# Patient Record
Sex: MALE | Race: BLACK OR AFRICAN AMERICAN | NOT HISPANIC OR LATINO | Employment: OTHER | URBAN - METROPOLITAN AREA
[De-identification: names, ages, dates, MRNs, and addresses within clinical notes are randomized per-mention and may not be internally consistent; named-entity substitution may affect disease eponyms.]

---

## 2019-03-27 ENCOUNTER — OFFICE VISIT (OUTPATIENT)
Dept: FAMILY MEDICINE | Facility: HOSPITAL | Age: 59
End: 2019-03-27
Payer: MEDICAID

## 2019-03-27 VITALS
SYSTOLIC BLOOD PRESSURE: 116 MMHG | HEIGHT: 68 IN | WEIGHT: 188.06 LBS | DIASTOLIC BLOOD PRESSURE: 68 MMHG | HEART RATE: 59 BPM | BODY MASS INDEX: 28.5 KG/M2

## 2019-03-27 DIAGNOSIS — E78.5 HYPERLIPIDEMIA, UNSPECIFIED HYPERLIPIDEMIA TYPE: ICD-10-CM

## 2019-03-27 DIAGNOSIS — M17.12 PRIMARY OSTEOARTHRITIS OF LEFT KNEE: Primary | ICD-10-CM

## 2019-03-27 DIAGNOSIS — I10 ESSENTIAL HYPERTENSION: ICD-10-CM

## 2019-03-27 PROCEDURE — 99203 OFFICE O/P NEW LOW 30 MIN: CPT | Performed by: STUDENT IN AN ORGANIZED HEALTH CARE EDUCATION/TRAINING PROGRAM

## 2019-03-27 RX ORDER — AZITHROMYCIN 250 MG/1
TABLET, FILM COATED ORAL
Refills: 0 | COMMUNITY
Start: 2018-12-31 | End: 2019-05-13

## 2019-03-27 RX ORDER — HYDROCODONE BITARTRATE AND ACETAMINOPHEN 7.5; 325 MG/1; MG/1
TABLET ORAL
Refills: 0 | COMMUNITY
Start: 2019-02-28 | End: 2019-05-13

## 2019-03-27 RX ORDER — TIZANIDINE 4 MG/1
TABLET ORAL
Refills: 3 | COMMUNITY
Start: 2019-02-28 | End: 2019-05-13

## 2019-03-27 RX ORDER — MELOXICAM 7.5 MG/1
7.5 TABLET ORAL
COMMUNITY
Start: 2019-03-26 | End: 2019-03-27 | Stop reason: ALTCHOICE

## 2019-03-27 RX ORDER — MELOXICAM 7.5 MG/1
TABLET ORAL
COMMUNITY
Start: 2019-03-26 | End: 2019-03-27 | Stop reason: ALTCHOICE

## 2019-03-27 RX ORDER — HYDROCODONE BITARTRATE AND ACETAMINOPHEN 10; 325 MG/1; MG/1
1 TABLET ORAL 2 TIMES DAILY PRN
Refills: 0 | COMMUNITY
Start: 2019-01-29 | End: 2020-10-06

## 2019-03-27 RX ORDER — ATORVASTATIN CALCIUM 40 MG/1
TABLET, FILM COATED ORAL
Refills: 3 | COMMUNITY
Start: 2019-02-28 | End: 2020-10-22

## 2019-03-27 RX ORDER — GABAPENTIN 100 MG/1
CAPSULE ORAL
Refills: 3 | COMMUNITY
Start: 2019-02-28 | End: 2019-05-13

## 2019-03-27 RX ORDER — DICLOFENAC SODIUM 75 MG/1
75 TABLET, DELAYED RELEASE ORAL
COMMUNITY
Start: 2018-12-08 | End: 2019-05-13

## 2019-03-27 RX ORDER — FAMOTIDINE 20 MG/1
20 TABLET, FILM COATED ORAL 2 TIMES DAILY
Qty: 60 TABLET | Refills: 11 | Status: SHIPPED | OUTPATIENT
Start: 2019-03-27 | End: 2019-05-13

## 2019-03-27 RX ORDER — SULINDAC 150 MG/1
150 TABLET ORAL 2 TIMES DAILY
Qty: 28 TABLET | Refills: 0 | Status: SHIPPED | OUTPATIENT
Start: 2019-03-27 | End: 2019-04-10

## 2019-03-27 RX ORDER — AMLODIPINE BESYLATE 5 MG/1
TABLET ORAL
Refills: 3 | COMMUNITY
Start: 2019-02-28 | End: 2020-10-22

## 2019-03-27 NOTE — PROGRESS NOTES
Subjective:       Patient ID: Robert Lake is a 58 y.o. male.    Chief Complaint: Establish Care    HPI     HTN norvasc 5mg atovastin 40    OA on left knee started 2 weeks, no hx of MVA and fall. He went to ED     He said nothing helps beside Narinder, he said Dr. Gallo is no longer writing him any prescrption. He said he tried he tried PT a few month ago, tried for 2~3 month.     He had inguinal hernia repair in 2008, got a referral to , but he couldn't go because he said he's taking care of his diabetic mother.        FINDINGS:  No acute fracture or malalignment identified. Joint spaces are maintained without significant arthrosis. Patellar enthesophyte formation. Moderate knee joint effusion. No localized soft tissue swelling.  No unintended radiopaque foreign body identified.       Review of Systems   Constitutional: Positive for chills. Negative for activity change, fatigue and fever.   HENT: Negative for hearing loss.    Eyes: Negative for visual disturbance.   Respiratory: Negative for cough, chest tightness and shortness of breath.    Cardiovascular: Negative for chest pain, palpitations and leg swelling.   Gastrointestinal: Negative for abdominal distention, abdominal pain, blood in stool, constipation, diarrhea, nausea and vomiting.   Genitourinary: Negative for dysuria.   Musculoskeletal: Positive for arthralgias. Negative for back pain.   Skin: Negative for color change and wound.   Neurological: Negative for headaches.   Psychiatric/Behavioral: Negative for agitation. The patient is not nervous/anxious.          Objective:      Vitals:    03/27/19 1338   BP: 116/68   Pulse: (!) 59     Physical Exam   Constitutional: He is oriented to person, place, and time. He appears well-developed and well-nourished. No distress.   HENT:   Head: Normocephalic and atraumatic.   Nose: Nose normal.   Eyes: Conjunctivae and EOM are normal. No scleral icterus.   Neck: Normal range of motion. Neck supple.    Cardiovascular: Normal rate, regular rhythm and normal heart sounds. Exam reveals no gallop and no friction rub.   No murmur heard.  Pulmonary/Chest: Effort normal and breath sounds normal. No respiratory distress. He has no wheezes. He has no rales. He exhibits no tenderness.   Abdominal: Soft. Bowel sounds are normal. He exhibits no distension and no mass. There is no tenderness. There is no rebound and no guarding.   Musculoskeletal: Normal range of motion. He exhibits tenderness (left knee with movement). He exhibits no edema or deformity.   Neurological: He is alert and oriented to person, place, and time.   Skin: Skin is warm. Capillary refill takes less than 2 seconds. He is not diaphoretic.   Psychiatric: He has a normal mood and affect.       Assessment:       1. Primary osteoarthritis of left knee    2. Essential hypertension    3. Hyperlipidemia, unspecified hyperlipidemia type        Plan:       Primary osteoarthritis of left knee  -     sulindac (CLINORIL) 150 MG tablet; Take 1 tablet (150 mg total) by mouth 2 (two) times daily. for 14 days  Dispense: 28 tablet; Refill: 0  -     famotidine (PEPCID) 20 MG tablet; Take 1 tablet (20 mg total) by mouth 2 (two) times daily.  Dispense: 60 tablet; Refill: 11    Essential hypertension  bp stable  cnt current tx    Hyperlipidemia, unspecified hyperlipidemia type  Diet and exercise  cnt current statin.    Follow up in about 2 weeks (around 4/10/2019) for knee injection.

## 2019-04-15 DIAGNOSIS — M25.562 LEFT KNEE PAIN, UNSPECIFIED CHRONICITY: Primary | ICD-10-CM

## 2019-04-16 ENCOUNTER — OFFICE VISIT (OUTPATIENT)
Dept: ORTHOPEDICS | Facility: CLINIC | Age: 59
End: 2019-04-16
Payer: MEDICAID

## 2019-04-16 VITALS
BODY MASS INDEX: 27.58 KG/M2 | SYSTOLIC BLOOD PRESSURE: 102 MMHG | WEIGHT: 182 LBS | HEIGHT: 68 IN | DIASTOLIC BLOOD PRESSURE: 64 MMHG

## 2019-04-16 DIAGNOSIS — M25.562 LEFT KNEE PAIN, UNSPECIFIED CHRONICITY: Primary | ICD-10-CM

## 2019-04-16 DIAGNOSIS — M25.462 EFFUSION, LEFT KNEE: ICD-10-CM

## 2019-04-16 PROCEDURE — 87102 FUNGUS ISOLATION CULTURE: CPT

## 2019-04-16 PROCEDURE — 87205 SMEAR GRAM STAIN: CPT

## 2019-04-16 PROCEDURE — 87075 CULTR BACTERIA EXCEPT BLOOD: CPT

## 2019-04-16 PROCEDURE — 99999 PR PBB SHADOW E&M-EST. PATIENT-LVL II: ICD-10-PCS | Mod: PBBFAC,,, | Performed by: ORTHOPAEDIC SURGERY

## 2019-04-16 PROCEDURE — 89060 EXAM SYNOVIAL FLUID CRYSTALS: CPT

## 2019-04-16 PROCEDURE — 89051 BODY FLUID CELL COUNT: CPT

## 2019-04-16 PROCEDURE — 87070 CULTURE OTHR SPECIMN AEROBIC: CPT

## 2019-04-16 PROCEDURE — 20611 DRAIN/INJ JOINT/BURSA W/US: CPT | Mod: PBBFAC,PN | Performed by: ORTHOPAEDIC SURGERY

## 2019-04-16 PROCEDURE — 99999 PR PBB SHADOW E&M-EST. PATIENT-LVL II: CPT | Mod: PBBFAC,,, | Performed by: ORTHOPAEDIC SURGERY

## 2019-04-16 PROCEDURE — 20611 DRAIN/INJ JOINT/BURSA W/US: CPT | Mod: S$PBB,LT,, | Performed by: ORTHOPAEDIC SURGERY

## 2019-04-16 PROCEDURE — 99204 PR OFFICE/OUTPT VISIT, NEW, LEVL IV, 45-59 MIN: ICD-10-PCS | Mod: 25,S$PBB,, | Performed by: ORTHOPAEDIC SURGERY

## 2019-04-16 PROCEDURE — 20611 PR DRAIN/ASP/INJECT MAJOR JOINT/BURSA W/US GUIDANCE: ICD-10-PCS | Mod: S$PBB,LT,, | Performed by: ORTHOPAEDIC SURGERY

## 2019-04-16 PROCEDURE — 99204 OFFICE O/P NEW MOD 45 MIN: CPT | Mod: 25,S$PBB,, | Performed by: ORTHOPAEDIC SURGERY

## 2019-04-16 PROCEDURE — 99212 OFFICE O/P EST SF 10 MIN: CPT | Mod: PBBFAC,PN,25 | Performed by: ORTHOPAEDIC SURGERY

## 2019-04-16 RX ORDER — TRIAMCINOLONE ACETONIDE 40 MG/ML
40 INJECTION, SUSPENSION INTRA-ARTICULAR; INTRAMUSCULAR
Status: DISCONTINUED | OUTPATIENT
Start: 2019-04-16 | End: 2020-10-06

## 2019-04-16 NOTE — PROGRESS NOTES
CC: left knee pain    58 y.o. Male presents today for evaluation of his left knee pain.   How long: Patient reports he has had left knee pain for over two weeks that has been getting worse. He reports he was cutting grass and the next day his left knee started swelling. Patient denies any injury or trauma. Patient reports his pain is excruciating and radiates from his knee to his hip and down to his foot. Patient reports his pain is constant.   What makes it better: Patient reports nothing is making him feel better.   What makes it worse: He feels the worst when walking or standing.   Does it radiate: Patient reports radiating pain from hip to foot.   Attempted treatments: Patient reports he has tried ice and heat and these did not help. He states he had an injection years ago into his right knee which helped a lot. Patient was given Tramadol for his knee pain but states this does not help his pain.   Pain score: Patient reports his pain is 10/10  Any mechanical symptoms: Patient reports his knee pops and clicks.   Feelings of instability: Patient reports his knee gives out on him daily.   Affect on ADLs: Patient reports he is unable to sleep.     REVIEW OF SYSTEMS:   Constitution: Patient denies fever, chills, night sweats, and weight changes.  Eyes: Patient denies eye pain or vision changes.  HENT: Patient denies headache, ear pain, sore throat, or nasal discharge.  CVS: Patient denies chest pain.  Lungs: Patient denies or cough or SOB at this time.   Abd: Patient denies stomach pain, nausea, or vomiting.  Skin: Patient denies skin rash or itching.    Hematologic/Lymphatic: Patient reports he bruises easily.   Musculoskeletal: Patient denies recent falls. See HPI.  Psych: Patient denies any current anxiety or nervousness.    PAST MEDICAL HISTORY:   History reviewed. No pertinent past medical history.    PAST SURGICAL HISTORY:   History reviewed. No pertinent surgical history.    FAMILY HISTORY:   History reviewed.  No pertinent family history.    SOCIAL HISTORY:   Social History     Socioeconomic History    Marital status: Single     Spouse name: Not on file    Number of children: Not on file    Years of education: Not on file    Highest education level: Not on file   Occupational History    Not on file   Social Needs    Financial resource strain: Not on file    Food insecurity:     Worry: Not on file     Inability: Not on file    Transportation needs:     Medical: Not on file     Non-medical: Not on file   Tobacco Use    Smoking status: Never Smoker    Smokeless tobacco: Never Used   Substance and Sexual Activity    Alcohol use: Never     Frequency: Never    Drug use: Never    Sexual activity: Yes     Partners: Female   Lifestyle    Physical activity:     Days per week: Not on file     Minutes per session: Not on file    Stress: Not on file   Relationships    Social connections:     Talks on phone: Not on file     Gets together: Not on file     Attends [employer] service: Not on file     Active member of club or organization: Not on file     Attends meetings of clubs or organizations: Not on file     Relationship status: Not on file   Other Topics Concern    Not on file   Social History Narrative    Not on file       MEDICATIONS:     Current Outpatient Medications:     amLODIPine (NORVASC) 5 MG tablet, TK 1 T PO QD, Disp: , Rfl: 3    atorvastatin (LIPITOR) 40 MG tablet, TK 1 T PO  QHS, Disp: , Rfl: 3    azithromycin (Z-LYDIA) 250 MG tablet, TAKE 2 TABLETS BY MOUTH TODAY THEN STARTING TOMORROW ONE DAILY FOR 4 DAYS, Disp: , Rfl: 0    diclofenac (VOLTAREN) 75 MG EC tablet, Take 75 mg by mouth., Disp: , Rfl:     famotidine (PEPCID) 20 MG tablet, Take 1 tablet (20 mg total) by mouth 2 (two) times daily., Disp: 60 tablet, Rfl: 11    gabapentin (NEURONTIN) 100 MG capsule, TK 1 C PO QD ATN, Disp: , Rfl: 3    HYDROcodone-acetaminophen (NORCO)  mg per tablet, Take 1 tablet by mouth 2 (two) times daily as  "needed., Disp: , Rfl: 0    HYDROcodone-acetaminophen (NORCO) 7.5-325 mg per tablet, TK SS TO ONE T PO BID PRN 30 DAY SUPPLY, Disp: , Rfl: 0    tiZANidine (ZANAFLEX) 4 MG tablet, TK 1/2 TO 1 T PO BID PRF SPASMS, Disp: , Rfl: 3    Current Facility-Administered Medications:     triamcinolone acetonide injection 40 mg, 40 mg, Intra-articular, 1 time in Clinic/HOD, Pineda New DO    ALLERGIES:   Review of patient's allergies indicates:  No Known Allergies     PHYSICAL EXAMINATION:  /64   Ht 5' 8" (1.727 m)   Wt 82.6 kg (182 lb)   BMI 27.67 kg/m²   Vitals signs and nursing note have been reviewed.  General: In no acute distress, well developed, well nourished, no diaphoresis  Eyes: EOM full and smooth, no eye redness or discharge  HENT: normocephalic and atraumatic, neck supple, trachea midline, no nasal discharge, no external ear redness or discharge  Cardiovascular: 2+ and symmetric DP pulses bilaterally, no LE edema  Lungs: respirations non-labored, no conversational dyspnea   Abd: non-distended, no rigidity  MSK: no amputation or deformity, no swelling of extremities  Neuro: AAOx3, CN2-12 grossly intact  Skin: No rashes, warm and dry  Psychiatric: cooperative, pleasant, mood and affect appropriate for age    MUSCULOSKELETAL EXAM:    LEFT KNEE EXAMINATION   Affected side is compared to contralateral knee     Observation:  No edema, erythema, ecchymosis noted. Large palpable suprapatellar effusion on the left.  No muscle atrophy of the thighs and calves noted.  No obvious bony deformities noted.   No genu valgus/varum noted. No recurvatum noted.    No tibial internal/external torsion.      Tenderness:  Patella - present   Lateral joint line - present  Quad tendon - none   Medial joint line - present  Patellar tendon - none   Medial plica - none  Tibial tubercle - none   Lateral plica - none  Pes anserine - none   MCL prox - none  Distal ITB - none   MCL distal - none  MFC - none    LCL prox - none  LFC " - none    LCL distal - none  Tibia - none    Fibula - none    No obvious bursae, plicae, popliteal cysts, or tendon derangement palpated.          ROM:   Active extension to 0° on left without hyperextension, lag, crepitus, or patellar J sign.   Active extension to 0° on right without hyperextension, lag, crepitus, or patellar J sign.   Active flexion to 80° on left and 135° on right.  Decreased motion on left due to pain.    Strength: (bilaterally)  Knee Flexion - 5/5 on left and 5/5 on right  Knee Extension - 5/5 on left and 5/5 on right  Hip Flexion - 5/5 on left and 5/5 on right  Hip Extension - 5/5 on left and 5/5 on right  Ankle dorsiflexion - 5/5 on left and 5/5 on right  Ankle Plantarflexion - 5/5 on left and 5/5 on right    Patellofemoral Exam:  Patellar ballottement - positive  Bulge sign - positive  Patellar grind - negative  No patellar laxity with medial and lateral translation   No apprehension with medial and lateral patellar translation.     Meniscus Testing:     Unable to accurately assess due to pain with motion from swelling    Ligament Testing:  Unable to accurately assess due to pain with motion from swelling    Neurovascular Examination:   Antalgic gait favoring left knee, improved post aspiration  Sensation intact to light touch in the obturator, lateral/intermediate/medial/posterior femoral cutaneous, saphenous, and common peroneal nerves bilaterally.  Pulses intact at the DP and PT arteries bilaterally.    Capillary refill intact <2 seconds in all toes bilaterally.      IMAGIN. X-ray ordered due to left knee pain.   2. X-ray images were reviewed personally by me and then directly with patient.  3. FINDINGS: X-ray images obtained demonstrate no cortical irregularities, sclerosis, osteophyte formation, or subchonral cysts. There is bilateral medial joint space narrowing.  4. IMPRESSION: No pathology or irregularities appreciated.       PROCEDURE:  Large Joint Aspiration/Injection  Knee  "joint, left    Performed by: YASMANI BEVERLY.  Authorized by: YASMANI BEVERLY  Consent Done?: Yes (Verbal and written)  Indications: Pain and joint effusion  Site marked: The procedure site was marked   Timeout: Prior to procedure the correct patient, procedure, and site was verified     Location: Knee joint, left  Prep: Patient was prepped with Chlorhexidine and alcohol.  Skin anesthetic: Ethyl Chloride spray was used prior to skin puncture. After cold spray was applied, 2-4 cc's of 0.5% bupivacaine was injected into the skin and superficial tissue at the injection site using a 25 G, 1.5" needle to form an anesthetic tunnel and ensure proper placement of the needle into the joint space.  Ultrasound Guidance for needle placement: yes  Procedure: After local anesthetic was applied a 18 G, 1.5 needle was used to enter the knee joint capsule under US guidance. 18cc of clear synovial fluid was aspirated. Following aspiration, 1 cc of 40 mg/ml triamcinolone acetonide was injected into the knee joint.   Approach: superiorlateral  Medications: 40 mg triamcinolone acetonide 40 mg/mL  Patient tolerance: Patient tolerated the procedure well with no immediate complications. Improvement noted after aspiration. Patient was wrapped in ACE wrap following bandaging.    Ultrasound guidance was used for needle localization. Images were saved and stored for documentation. Short and long axis images of the anterior knee were taken prior to injection confirming presence of joint effusion. Dynamic visualization of the needle was continuous throughout the procedures.    Triamcinolone:  NDC: [address]-4  LOT: [id number]  EXP: 09/2020    ASSESSMENT:      ICD-10-CM ICD-9-CM   1. Left knee pain, unspecified chronicity M25.562 719.46   2. Effusion, left knee M25.462 719.06       PLAN:  1-2.  Left knee pain/effusion -     - Robert has been suffering from left knee pain and swelling for the past 2 weeks.  He denies any specific injury or " incident that started the swelling, but states that he was mowing the lawn the day before it started. He denies any twisting injuries with mowing the lawn.  He has not found any benefit from icing or heat.  He was given Mobic from his primary care but stopped taking it due to it being ineffective.  He also states that he had tramadol which he took but this did not help him either.    - After evaluating and noting the large suprapatellar effusion, I discussed the pros and cons of an attempted joint aspiration today. I also stated that depending on what the fluid looked like we could inject cortisone into the knee to help decrease the intra-articular cortisone injection. He consented to this procedure. See above for procedure detail.    - Upon evaluation of the knee effusion under ultrasound, there was multiple pockets of fluid with multiple areas of thickened synovium.  I was able to navigate the needle around the thickened synovium to aspirate some fluid, after which she felt immediately better.  There is an additional pocket of fluid excess full from the medial aspect of the knee. An attempt was made aspirate this medial area, but I was unable to successfully aspirate any fluid. After the procedure was completed, he was able to stand up and walk and immediately was pleased with his pain reduction.    - Knee joint fluid was sent to the lab for further evaluation.    - I advised him to restart the Mobic 15 mg tablets daily for 2 weeks to help prevent swelling from returning.  I also recommended him avoid heat as this will make swelling worse.  I recommend icing and wrapping consistently, especially in the first few days post aspiration.      Future planning includes - reassess in 2-3 weeks if swelling returns.  MRI would be indicated if swelling continues.    All questions were answered to the best of my ability and all concerns were addressed at this time.    Follow up in 2-3 weeks for above if needed.

## 2019-04-17 LAB
APPEARANCE FLD: NORMAL
BODY FLD TYPE: NORMAL
BODY FLD TYPE: NORMAL
COLOR FLD: YELLOW
CRYSTALS FLD MICRO: NEGATIVE
EOSINOPHIL NFR FLD MANUAL: 1 %
GRAM STN SPEC: NORMAL
GRAM STN SPEC: NORMAL
LYMPHOCYTES NFR FLD MANUAL: 7 %
MESOTHL CELL NFR FLD MANUAL: 1 %
MONOS+MACROS NFR FLD MANUAL: 86 %
NEUTROPHILS NFR FLD MANUAL: 5 %
PATH INTERP FLD-IMP: NORMAL
WBC # FLD: 145 /CU MM

## 2019-04-20 LAB
BACTERIA SPEC AEROBE CULT: NO GROWTH
BACTERIA SPEC AEROBE CULT: NO GROWTH

## 2019-04-23 ENCOUNTER — TELEPHONE (OUTPATIENT)
Dept: SPORTS MEDICINE | Facility: CLINIC | Age: 59
End: 2019-04-23

## 2019-04-23 NOTE — TELEPHONE ENCOUNTER
----- Message from Kendy Fonseca sent at 4/23/2019  2:13 PM CDT -----  Contact: self/[phone]  Type:  Sooner Apoointment Request    Caller is requesting a sooner appointment.  Caller declined first available appointment listed below.  Caller will not accept being placed on the waitlist and is requesting a message be sent to doctor.  Name of Caller: Jones  When is the first available appointment? 05/14/19  Symptoms: Fluid on knee/ankle  Would the patient rather a call back or a response via [facility]?  Call back  Best Call Back Number: [phone]  Additional Information: cannot put his sock on

## 2019-04-23 NOTE — TELEPHONE ENCOUNTER
Left VM letting patient know that Dr. New does not have any availability in [place] until the second week in May. I instructed him to go to an Urgent Care or ED if he is in that much pain. I also let him know that the phone staff sent his information to Dr. Cadet who may be able to see him sooner but that again if he was in a lot of pain and uncomfortable that he should go to the ED.

## 2019-04-24 LAB — BACTERIA SPEC ANAEROBE CULT: NORMAL

## 2019-05-13 ENCOUNTER — OFFICE VISIT (OUTPATIENT)
Dept: FAMILY MEDICINE | Facility: HOSPITAL | Age: 59
End: 2019-05-13
Attending: FAMILY MEDICINE
Payer: MEDICAID

## 2019-05-13 VITALS
SYSTOLIC BLOOD PRESSURE: 114 MMHG | HEART RATE: 61 BPM | HEIGHT: 69 IN | BODY MASS INDEX: 26.12 KG/M2 | DIASTOLIC BLOOD PRESSURE: 72 MMHG | WEIGHT: 176.38 LBS

## 2019-05-13 DIAGNOSIS — E78.2 MIXED HYPERLIPIDEMIA: ICD-10-CM

## 2019-05-13 DIAGNOSIS — F41.1 GAD (GENERALIZED ANXIETY DISORDER): ICD-10-CM

## 2019-05-13 DIAGNOSIS — M17.12 PRIMARY OSTEOARTHRITIS OF LEFT KNEE: Primary | ICD-10-CM

## 2019-05-13 DIAGNOSIS — I10 ESSENTIAL HYPERTENSION: ICD-10-CM

## 2019-05-13 PROCEDURE — 99214 OFFICE O/P EST MOD 30 MIN: CPT | Performed by: STUDENT IN AN ORGANIZED HEALTH CARE EDUCATION/TRAINING PROGRAM

## 2019-05-13 RX ORDER — NAPROXEN 500 MG/1
500 TABLET ORAL 2 TIMES DAILY
Refills: 0 | COMMUNITY
Start: 2019-04-07 | End: 2019-05-13

## 2019-05-13 RX ORDER — AMITRIPTYLINE HYDROCHLORIDE 10 MG/1
10 TABLET, FILM COATED ORAL NIGHTLY PRN
Qty: 30 TABLET | Refills: 2 | Status: SHIPPED | OUTPATIENT
Start: 2019-05-13 | End: 2019-08-15 | Stop reason: SDUPTHER

## 2019-05-13 RX ORDER — MELOXICAM 7.5 MG/1
15 TABLET ORAL DAILY
Qty: 34 TABLET | Refills: 0 | Status: SHIPPED | OUTPATIENT
Start: 2019-05-13 | End: 2019-05-30

## 2019-05-13 NOTE — PROGRESS NOTES
Subjective:       Patient ID: Robert Lake is a 58 y.o. male.    Chief Complaint: Osteoarthritis    HPI   Feel depressed about knee pain for a while. He said he feels sad and would like something to help with his anxiety.ANA and PHQ high. Advise to take elavil at night only. Don't take it before drive. He said his mother and him have taking care of a 15mo boy. His left knee has been tapped and was given a steroid injection 3 wks ago w/o any help.     Colonoscopy 2017 was told normal.    Review of Systems   Constitutional: Negative for activity change, chills, fatigue and fever.   HENT: Negative for hearing loss.    Eyes: Negative for visual disturbance.   Respiratory: Negative for cough, chest tightness and shortness of breath.    Cardiovascular: Negative for chest pain, palpitations and leg swelling.   Gastrointestinal: Negative for abdominal distention, abdominal pain, blood in stool, constipation, diarrhea, nausea and vomiting.   Genitourinary: Negative for dysuria.   Musculoskeletal: Positive for arthralgias and joint swelling. Negative for back pain.   Skin: Negative for color change and wound.   Neurological: Negative for headaches.   Psychiatric/Behavioral: Negative for agitation. The patient is not nervous/anxious.            Objective:      Vitals:    05/13/19 1411   BP: 114/72   Pulse: 61     Physical Exam   Constitutional: He is oriented to person, place, and time. He appears well-developed and well-nourished. No distress.   HENT:   Head: Normocephalic and atraumatic.   Nose: Nose normal.   Eyes: Conjunctivae and EOM are normal. No scleral icterus.   Neck: Normal range of motion. Neck supple.   Cardiovascular: Normal rate, regular rhythm and normal heart sounds. Exam reveals no gallop and no friction rub.   No murmur heard.  Pulmonary/Chest: Effort normal and breath sounds normal. No respiratory distress. He has no wheezes. He has no rales. He exhibits no tenderness.   Abdominal: Soft. Bowel sounds are  normal. He exhibits no distension and no mass. There is no tenderness. There is no rebound and no guarding.   Musculoskeletal: Normal range of motion. He exhibits tenderness (left knee with movement). He exhibits no edema or deformity.   Neurological: He is alert and oriented to person, place, and time.   Skin: Skin is warm. Capillary refill takes less than 2 seconds. He is not diaphoretic.   Psychiatric: He has a normal mood and affect.       Assessment:       1. Primary osteoarthritis of left knee    2. Essential hypertension    3. Mixed hyperlipidemia    4. ANA (generalized anxiety disorder)        Plan:       Primary osteoarthritis of left knee  -     Cancel: Ambulatory Referral to Physical/Occupational Therapy  -     Ambulatory Referral to Physical/Occupational Therapy  -     amitriptyline (ELAVIL) 10 MG tablet; Take 1 tablet (10 mg total) by mouth nightly as needed for Insomnia or Pain.  Dispense: 30 tablet; Refill: 2  -     meloxicam (MOBIC) 7.5 MG tablet; Take 2 tablets (15 mg total) by mouth once daily. for 17 days  Dispense: 34 tablet; Refill: 0  Essential hypertension  bp at goal, cnt curent meds      Mixed hyperlipidemia  cnt statin    ANA (generalized anxiety disorder)  Elavil added  Will reassesses.       Follow up in about 1 month (around 6/10/2019) for OA and pain and anxiety. Will consider lyrica, SSRI for chronic pain sx 2/2 knee pain.

## 2019-05-13 NOTE — PROGRESS NOTES
I have reviewed the notes, assessments, and/or procedures performed, I concur with her/his documentation of Robert Lake.

## 2019-05-20 LAB — FUNGUS SPEC CULT: NORMAL

## 2019-08-15 RX ORDER — AMITRIPTYLINE HYDROCHLORIDE 10 MG/1
10 TABLET, FILM COATED ORAL NIGHTLY PRN
Qty: 30 TABLET | Refills: 2 | Status: SHIPPED | OUTPATIENT
Start: 2019-08-15 | End: 2020-10-06

## 2019-10-07 ENCOUNTER — TELEPHONE (OUTPATIENT)
Dept: SURGERY | Facility: CLINIC | Age: 59
End: 2019-10-07

## 2019-11-25 ENCOUNTER — OFFICE VISIT (OUTPATIENT)
Dept: ORTHOPEDICS | Facility: CLINIC | Age: 59
End: 2019-11-25
Payer: MEDICAID

## 2019-11-25 VITALS — HEIGHT: 69 IN | BODY MASS INDEX: 26.43 KG/M2

## 2019-11-25 DIAGNOSIS — M25.561 CHRONIC PAIN OF BOTH KNEES: Primary | ICD-10-CM

## 2019-11-25 DIAGNOSIS — M17.0 PRIMARY OSTEOARTHRITIS OF BOTH KNEES: ICD-10-CM

## 2019-11-25 DIAGNOSIS — G89.29 CHRONIC PAIN OF BOTH KNEES: Primary | ICD-10-CM

## 2019-11-25 DIAGNOSIS — M25.562 CHRONIC PAIN OF BOTH KNEES: Primary | ICD-10-CM

## 2019-11-25 PROCEDURE — 20611 PR DRAIN/ASP/INJECT MAJOR JOINT/BURSA W/US GUIDANCE: ICD-10-PCS | Mod: 50,S$PBB,, | Performed by: ORTHOPAEDIC SURGERY

## 2019-11-25 PROCEDURE — 99212 OFFICE O/P EST SF 10 MIN: CPT | Mod: PBBFAC,PN | Performed by: ORTHOPAEDIC SURGERY

## 2019-11-25 PROCEDURE — 20611 DRAIN/INJ JOINT/BURSA W/US: CPT | Mod: 50,S$PBB,, | Performed by: ORTHOPAEDIC SURGERY

## 2019-11-25 PROCEDURE — 99999 PR PBB SHADOW E&M-EST. PATIENT-LVL II: ICD-10-PCS | Mod: PBBFAC,,, | Performed by: ORTHOPAEDIC SURGERY

## 2019-11-25 PROCEDURE — 99214 OFFICE O/P EST MOD 30 MIN: CPT | Mod: 25,S$PBB,, | Performed by: ORTHOPAEDIC SURGERY

## 2019-11-25 PROCEDURE — 99999 PR PBB SHADOW E&M-EST. PATIENT-LVL II: CPT | Mod: PBBFAC,,, | Performed by: ORTHOPAEDIC SURGERY

## 2019-11-25 PROCEDURE — 99214 PR OFFICE/OUTPT VISIT, EST, LEVL IV, 30-39 MIN: ICD-10-PCS | Mod: 25,S$PBB,, | Performed by: ORTHOPAEDIC SURGERY

## 2019-11-25 PROCEDURE — 20611 DRAIN/INJ JOINT/BURSA W/US: CPT | Mod: 50,PBBFAC,PN | Performed by: ORTHOPAEDIC SURGERY

## 2019-11-25 RX ORDER — GABAPENTIN 100 MG/1
CAPSULE ORAL
Refills: 1 | COMMUNITY
Start: 2019-10-31 | End: 2020-10-06

## 2019-11-25 RX ORDER — TRIAMCINOLONE ACETONIDE 40 MG/ML
40 INJECTION, SUSPENSION INTRA-ARTICULAR; INTRAMUSCULAR
Status: DISCONTINUED | OUTPATIENT
Start: 2019-11-25 | End: 2020-10-06

## 2019-11-25 RX ORDER — MELOXICAM 15 MG/1
TABLET ORAL
Refills: 4 | COMMUNITY
Start: 2019-11-19 | End: 2020-10-06

## 2019-11-25 RX ORDER — TIZANIDINE 4 MG/1
TABLET ORAL
COMMUNITY
End: 2020-10-06

## 2019-11-25 NOTE — PROGRESS NOTES
CC:  Bilateral knee pain    HPI: Robert is here today for follow-up on his knee pain. He was last seen April 2019 where he had a left knee aspiration/injection.  He states that the aspiration helped, but he is unsure how long the cortisone helped.  He now states that both knees bother him greatly and he is interested in something that can help with his pain today. He denies any new injuries or trauma.  He states that over the last several months walking and bending over has become more and more difficult.  He states that he has been in physical therapy in between his last visit and now and states that the 12 sessions that he went to was not effective at decreasing his pain.    Recall from visit on 04/16/2019  59 y.o. Male presents today for evaluation of his left knee pain.   How long: Patient reports he has had left knee pain for over two weeks that has been getting worse. He reports he was cutting grass and the next day his left knee started swelling. Patient denies any injury or trauma. Patient reports his pain is excruciating and radiates from his knee to his hip and down to his foot. Patient reports his pain is constant.   What makes it better: Patient reports nothing is making him feel better.   What makes it worse: He feels the worst when walking or standing.   Does it radiate: Patient reports radiating pain from hip to foot.   Attempted treatments: Patient reports he has tried ice and heat and these did not help. He states he had an injection years ago into his right knee which helped a lot. Patient was given Tramadol for his knee pain but states this does not help his pain.   Pain score: Patient reports his pain is 10/10  Any mechanical symptoms: Patient reports his knee pops and clicks.   Feelings of instability: Patient reports his knee gives out on him daily.   Affect on ADLs: Patient reports he is unable to sleep.     REVIEW OF SYSTEMS:   Constitution: Patient denies fever, chills, night sweats, and  weight changes.  Eyes: Patient denies eye pain or vision changes.  HENT: Patient denies headache, ear pain, sore throat, or nasal discharge.  CVS: Patient denies chest pain.  Lungs: Patient denies or cough or SOB at this time.   Abd: Patient denies stomach pain, nausea, or vomiting.  Skin: Patient denies skin rash or itching.    Hematologic/Lymphatic: Patient reports he bruises easily.   Musculoskeletal: Patient denies recent falls. See HPI.  Psych: Patient denies any current anxiety or nervousness.    PAST MEDICAL HISTORY:   History reviewed. No pertinent past medical history.    PAST SURGICAL HISTORY:   History reviewed. No pertinent surgical history.    FAMILY HISTORY:   History reviewed. No pertinent family history.    SOCIAL HISTORY:   Social History     Socioeconomic History    Marital status: Single     Spouse name: Not on file    Number of children: Not on file    Years of education: Not on file    Highest education level: Not on file   Occupational History    Not on file   Social Needs    Financial resource strain: Not on file    Food insecurity:     Worry: Not on file     Inability: Not on file    Transportation needs:     Medical: Not on file     Non-medical: Not on file   Tobacco Use    Smoking status: Never Smoker    Smokeless tobacco: Never Used   Substance and Sexual Activity    Alcohol use: Never     Frequency: Never    Drug use: Never    Sexual activity: Yes     Partners: Female   Lifestyle    Physical activity:     Days per week: Not on file     Minutes per session: Not on file    Stress: Not on file   Relationships    Social connections:     Talks on phone: Not on file     Gets together: Not on file     Attends Zoroastrianism service: Not on file     Active member of club or organization: Not on file     Attends meetings of clubs or organizations: Not on file     Relationship status: Not on file   Other Topics Concern    Not on file   Social History Narrative    Not on file  "      MEDICATIONS:     Current Outpatient Medications:     atorvastatin (LIPITOR) 40 MG tablet, TK 1 T PO  QHS, Disp: , Rfl: 3    amitriptyline (ELAVIL) 10 MG tablet, Take 1 tablet (10 mg total) by mouth nightly as needed for Insomnia or Pain., Disp: 30 tablet, Rfl: 2    amLODIPine (NORVASC) 5 MG tablet, TK 1 T PO QD, Disp: , Rfl: 3    gabapentin (NEURONTIN) 100 MG capsule, TK 1 C PO QD ATN, Disp: , Rfl: 1    HYDROcodone-acetaminophen (NORCO)  mg per tablet, Take 1 tablet by mouth 2 (two) times daily as needed., Disp: , Rfl: 0    meloxicam (MOBIC) 15 MG tablet, TK 1 T PO QD PRF PAIN, Disp: , Rfl: 4    tiZANidine (ZANAFLEX) 4 MG tablet, tizanidine 4 mg tablet, Disp: , Rfl:     Current Facility-Administered Medications:     triamcinolone acetonide injection 40 mg, 40 mg, Intra-articular, 1 time in Clinic/HOD, Pineda New DO    ALLERGIES:   Review of patient's allergies indicates:  No Known Allergies     PHYSICAL EXAMINATION:   5' 8.5" (1.74 m)   BMI 26.43 kg/m²   Vitals signs and nursing note have been reviewed.  General: In no acute distress, well developed, well nourished, no diaphoresis  Eyes: EOM full and smooth, no eye redness or discharge  HENT: normocephalic and atraumatic, neck supple, trachea midline, no nasal discharge, no external ear redness or discharge  Cardiovascular: 2+ and symmetric DP pulses bilaterally, no LE edema  Lungs: respirations non-labored, no conversational dyspnea   Abd: non-distended, no rigidity  MSK: no amputation or deformity, no swelling of extremities  Neuro: AAOx3, CN2-12 grossly intact  Skin: No rashes, warm and dry  Psychiatric: cooperative, pleasant, mood and affect appropriate for age    MUSCULOSKELETAL EXAM:    BILATERAL KNEE EXAMINATION   Affected side is compared to contralateral knee     Observation:  No edema, erythema, ecchymosis noted. No effusion of either knee.  No muscle atrophy of the thighs and calves noted.  No obvious bony deformities noted. "   No genu valgus/varum noted. No recurvatum noted.    No tibial internal/external torsion.      Tenderness:  Patella - present   Lateral joint line - present  Quad tendon - none   Medial joint line - present  Patellar tendon - none   Medial plica - none  Tibial tubercle - none   Lateral plica - none  Pes anserine - none   MCL prox - none  Distal ITB - none   MCL distal - none  MFC - none    LCL prox - none  LFC - none    LCL distal - none  Tibia - none    Fibula - none    No obvious bursae, plicae, popliteal cysts, or tendon derangement palpated.          ROM:   Active extension to 0° on left without hyperextension, lag, crepitus, or patellar J sign.   Active extension to 0° on right without hyperextension, lag, crepitus, or patellar J sign.   Active flexion to 135° on left and 135° on right.  Flexion of both knees causes pain.    Strength: (bilaterally)  Knee Flexion - 5/5 on left and 5/5 on right  Knee Extension - 5/5 on left and 5/5 on right  Hip Flexion - 5/5 on left and 5/5 on right  Hip Extension - 5/5 on left and 5/5 on right  Ankle dorsiflexion - 5/5 on left and 5/5 on right  Ankle Plantarflexion - 5/5 on left and 5/5 on right    Patellofemoral Exam:  Patellar ballottement - negative  Bulge sign - negative  Patellar grind - negative  No patellar laxity with medial and lateral translation   + apprehension with medial and lateral patellar translation.     Neurovascular Examination:   Non antalgic gait  Sensation intact to light touch in the obturator, lateral/intermediate/medial/posterior femoral cutaneous, saphenous, and common peroneal nerves bilaterally.  Pulses intact at the DP and PT arteries bilaterally.    Capillary refill intact <2 seconds in all toes bilaterally.      IMAGIN. X-ray obtained on 2019 due to left knee pain.   2. X-ray images were reviewed personally by me and then directly with patient.  3. FINDINGS: X-ray images obtained demonstrate no there is mild left-sided and moderate  right-sided medial compartment joint space loss.  There is osseous spur at the superior aspect of the left patella.  There is no fracture, dislocation, or bony erosion..  4. IMPRESSION: As above.      PROCEDURE:  Large Joint Aspiration/Injection  Knee joint, bilateral  Performed by: YASMANI BEVERLY.  Authorized by: YASMANI BEVERLY  Consent Done?: Yes (Verbal and written)  Indications: Pain  Site marked: The procedure site was marked   Timeout: Prior to procedure the correct patient, procedure, and site was verified   Location: Knee joint, bilateral  Prep: Patient was prepped with Chlorhexidine and alcohol.  Skin anesthetic: Ethyl Chloride spray was used prior to skin puncture.  Ultrasound Guidance for needle placement: yes  Procedure: After local anesthetic was applied, the 25G, 1.5 needle was used to enter the knee joint capsule under US guidance. A 3 cc mixture of 1 cc of 40 mg/ml triamcinolone acetonide and 2 cc of 0.2% ropivacaine were injected into the knee joint. The procedure was performed on both knees.  Approach: superiorlateral  Medications: 40 mg triamcinolone acetonide 40 mg/mL  Patient tolerance: Patient tolerated the procedure well with no immediate complications    Ultrasound guidance was used for needle localization. Images were saved and stored for documentation. Short and long axis images of both anterior knees were taken prior to injection. Dynamic visualization of the needle was continuous throughout the procedures.    Triamcinolone:  NDC: [address]-5  LOT: [id number]  EXP: 07/2021      ASSESSMENT:      ICD-10-CM ICD-9-CM   1. Chronic pain of both knees M25.561 719.46    M25.562 338.29    G89.29    2. Primary osteoarthritis of both knees M17.0 715.16       PLAN:  1-2.  Bilateral knee pain/osteoarthritis - unchanged    - Jones admits to continued left knee pain and is now having right knee pain since his last visit.  He had an aspiration done by me on 4/16/19 and admits to improvement following  this.  He states he went to 12 sessions of physical therapy and his pain did not improve at all with this.    - We reviewed the natural history of osteoarthritis and a multipronged treatment approach. We reviewed the importance of addressing three different aspects to best manage this condition. Controlling the intra-articular immune reaction through medications and/or injections, improving local stability through bracing and/or injection, and improving functional stability through hip, core, and ankle strength, stability and mobility which may benefit from formal physical therapy.    - After discussing options, he would like to proceed with bilateral cortisone injections today.  See above for procedure detail.    - As he is getting concerned that his pain is worsening, he would like to speak with a joint replacement surgeon.  Referral to Dr. Cadet has been placed.      Future planning includes - follow up with Dr. Cadet to discuss possible surgical options.    All questions were answered to the best of my ability and all concerns were addressed at this time.    Follow up with me as needed.      This note is dictated using the M*Modal Fluency Direct word recognition program. There are word recognition mistakes that are occasionally missed on review.

## 2019-12-03 ENCOUNTER — OFFICE VISIT (OUTPATIENT)
Dept: SURGERY | Facility: CLINIC | Age: 59
End: 2019-12-03
Payer: MEDICAID

## 2019-12-03 VITALS
HEART RATE: 71 BPM | HEIGHT: 69 IN | SYSTOLIC BLOOD PRESSURE: 154 MMHG | DIASTOLIC BLOOD PRESSURE: 85 MMHG | BODY MASS INDEX: 25.42 KG/M2 | WEIGHT: 171.63 LBS

## 2019-12-03 DIAGNOSIS — R10.31 RIGHT INGUINAL PAIN: Primary | ICD-10-CM

## 2019-12-03 PROCEDURE — 99204 OFFICE O/P NEW MOD 45 MIN: CPT | Mod: S$GLB,,, | Performed by: SURGERY

## 2019-12-03 PROCEDURE — 99204 PR OFFICE/OUTPT VISIT, NEW, LEVL IV, 45-59 MIN: ICD-10-PCS | Mod: S$GLB,,, | Performed by: SURGERY

## 2019-12-03 NOTE — LETTER
December 3, 2019      Lena Fraga MD  [address]  [address]  [facility] [address]           [facility], [facility]  [address], [address]  [facility] [address]  Phone: [phone]  Fax: [phone]          Patient: Robert Lake   MR Number: [id number]   YOB: 1960   Date of Visit: 12/3/2019       Dear Dr. Lena Fraga:    Thank you for referring Robert Lake to me for evaluation. Attached you will find relevant portions of my assessment and plan of care.    If you have questions, please do not hesitate to call me. I look forward to following Robert Lake along with you.    Sincerely,    Jer Green MD    Enclosure  CC:  No Recipients    If you would like to receive this communication electronically, please contact [email or web address] or [phone] to request more information on [employer] [facility] Link access.    For providers and/or their staff who would like to refer a patient to [facility], please contact us through our one-stop-shop provider referral line, [facility], at [phone].    If you feel you have received this communication in error or would no longer like to receive these types of communications, please e-mail [email or web address]

## 2019-12-04 DIAGNOSIS — R10.31 RIGHT INGUINAL PAIN: Primary | ICD-10-CM

## 2019-12-05 ENCOUNTER — HOSPITAL ENCOUNTER (OUTPATIENT)
Dept: RADIOLOGY | Facility: HOSPITAL | Age: 59
Discharge: HOME | End: 2019-12-05
Attending: SURGERY
Payer: MEDICAID

## 2019-12-05 DIAGNOSIS — R10.31 RIGHT INGUINAL PAIN: ICD-10-CM

## 2019-12-05 PROCEDURE — 74177 CT ABD & PELVIS W/CONTRAST: CPT | Mod: TC

## 2019-12-05 PROCEDURE — 74177 CT ABD & PELVIS W/CONTRAST: CPT | Mod: 26,,, | Performed by: RADIOLOGY

## 2019-12-05 PROCEDURE — 25500020 PHARM REV CODE 255: Performed by: SURGERY

## 2019-12-05 PROCEDURE — 74177 CT ABDOMEN PELVIS WITH CONTRAST: ICD-10-PCS | Mod: 26,,, | Performed by: RADIOLOGY

## 2019-12-05 RX ADMIN — IOHEXOL 15 ML: 300 INJECTION, SOLUTION INTRAVENOUS at 02:12

## 2019-12-05 RX ADMIN — IOHEXOL 85 ML: 350 INJECTION, SOLUTION INTRAVENOUS at 03:12

## 2019-12-10 ENCOUNTER — OFFICE VISIT (OUTPATIENT)
Dept: SURGERY | Facility: CLINIC | Age: 59
End: 2019-12-10
Payer: MEDICAID

## 2019-12-10 VITALS
HEIGHT: 69 IN | BODY MASS INDEX: 25.33 KG/M2 | HEART RATE: 75 BPM | WEIGHT: 171 LBS | SYSTOLIC BLOOD PRESSURE: 120 MMHG | DIASTOLIC BLOOD PRESSURE: 75 MMHG

## 2019-12-10 DIAGNOSIS — R10.31 RIGHT INGUINAL PAIN: Primary | ICD-10-CM

## 2019-12-10 PROCEDURE — 99214 OFFICE O/P EST MOD 30 MIN: CPT | Mod: S$GLB,,, | Performed by: SURGERY

## 2019-12-10 PROCEDURE — 99214 PR OFFICE/OUTPT VISIT, EST, LEVL IV, 30-39 MIN: ICD-10-PCS | Mod: S$GLB,,, | Performed by: SURGERY

## 2019-12-10 NOTE — PROGRESS NOTES
Subjective:       Patient ID: Robert Lake is a 59 y.o. male.    Chief Complaint: Follow-up (w/ ct scan)    HPI 60 yo male with right groin pain s/p inguinal hernia with ongoing chronic pain and no evidence of hernia  Review of Systems   Constitutional: Negative.    HENT: Negative.    Eyes: Negative.    Respiratory: Negative.    Cardiovascular: Negative.    Gastrointestinal: Negative.    Endocrine: Negative.    Musculoskeletal: Negative.    Skin: Negative.    Allergic/Immunologic: Negative.    Neurological: Negative.    Hematological: Negative.    Psychiatric/Behavioral: Negative.    All other systems reviewed and are negative.      Objective:      Physical Exam   Constitutional: He is oriented to person, place, and time. He appears well-developed and well-nourished.   HENT:   Head: Normocephalic and atraumatic.   Right Ear: External ear normal.   Left Ear: External ear normal.   Nose: Nose normal.   Mouth/Throat: Oropharynx is clear and moist.   Eyes: Pupils are equal, round, and reactive to light. Conjunctivae and EOM are normal.   Neck: Normal range of motion. Neck supple.   Cardiovascular: Normal rate, regular rhythm, normal heart sounds and intact distal pulses.   Pulmonary/Chest: Effort normal and breath sounds normal.   Abdominal: Soft. Bowel sounds are normal.   Musculoskeletal: Normal range of motion.   Neurological: He is alert and oriented to person, place, and time. He has normal reflexes.   Skin: Skin is warm and dry.   Psychiatric: He has a normal mood and affect. His behavior is normal. Thought content normal.   Vitals reviewed.      Assessment:       1. Right inguinal pain      no evidence of recurrent hernia  Plan:       I will try to send him to [facility]

## 2019-12-11 ENCOUNTER — TELEPHONE (OUTPATIENT)
Dept: SURGERY | Facility: CLINIC | Age: 59
End: 2019-12-11

## 2019-12-11 NOTE — TELEPHONE ENCOUNTER
Pt notified Dr. Rodriguez does not accept his insurance.  Pt is going to call his insurance company and see what pain management drs are on his plan.

## 2020-02-18 DIAGNOSIS — R10.30 PAIN IN THE GROIN: Primary | ICD-10-CM

## 2020-07-20 ENCOUNTER — TELEPHONE (OUTPATIENT)
Dept: ORTHOPEDICS | Facility: CLINIC | Age: 60
End: 2020-07-20

## 2020-07-20 DIAGNOSIS — M25.561 PAIN IN BOTH KNEES, UNSPECIFIED CHRONICITY: Primary | ICD-10-CM

## 2020-07-20 DIAGNOSIS — M25.562 PAIN IN BOTH KNEES, UNSPECIFIED CHRONICITY: Primary | ICD-10-CM

## 2020-07-21 ENCOUNTER — TELEPHONE (OUTPATIENT)
Dept: ORTHOPEDICS | Facility: CLINIC | Age: 60
End: 2020-07-21

## 2020-07-21 NOTE — TELEPHONE ENCOUNTER
----- Message from Kaur Fernandez sent at 7/21/2020  4:09 PM CDT -----  Type:  Needs Medical Advice    Who Called: self  Reason:patient needs to reschedule appointment and x ray, System wasn't allowing me to do it. [place] location   Would the patient rather a call back or a response via [facility]? callback  Best Call Back Number: [phone]  Additional Information: none

## 2020-08-11 ENCOUNTER — OFFICE VISIT (OUTPATIENT)
Dept: ORTHOPEDICS | Facility: CLINIC | Age: 60
End: 2020-08-11
Payer: MEDICAID

## 2020-08-11 VITALS — WEIGHT: 173 LBS | HEIGHT: 69 IN | BODY MASS INDEX: 25.62 KG/M2

## 2020-08-11 DIAGNOSIS — M17.0 PRIMARY OSTEOARTHRITIS OF BOTH KNEES: Primary | ICD-10-CM

## 2020-08-11 PROCEDURE — 99214 OFFICE O/P EST MOD 30 MIN: CPT | Mod: S$PBB,,, | Performed by: ORTHOPAEDIC SURGERY

## 2020-08-11 PROCEDURE — 99999 PR PBB SHADOW E&M-EST. PATIENT-LVL III: CPT | Mod: PBBFAC,,, | Performed by: ORTHOPAEDIC SURGERY

## 2020-08-11 PROCEDURE — 99214 PR OFFICE/OUTPT VISIT, EST, LEVL IV, 30-39 MIN: ICD-10-PCS | Mod: S$PBB,,, | Performed by: ORTHOPAEDIC SURGERY

## 2020-08-11 PROCEDURE — 99999 PR PBB SHADOW E&M-EST. PATIENT-LVL III: ICD-10-PCS | Mod: PBBFAC,,, | Performed by: ORTHOPAEDIC SURGERY

## 2020-08-11 PROCEDURE — 99213 OFFICE O/P EST LOW 20 MIN: CPT | Mod: PBBFAC,PN | Performed by: ORTHOPAEDIC SURGERY

## 2020-08-11 RX ORDER — ONDANSETRON 4 MG/1
TABLET, FILM COATED ORAL
COMMUNITY
Start: 2020-07-03 | End: 2020-10-06

## 2020-08-11 RX ORDER — MECLIZINE HCL 12.5 MG 12.5 MG/1
TABLET ORAL
COMMUNITY
Start: 2020-07-03 | End: 2020-10-06

## 2020-08-11 NOTE — PROGRESS NOTES
Subjective:      Patient ID: Robert Lake is a 60 y.o. male.    Chief Complaint: Knee Pain (bilateral pain )    HPI     They have experienced problems with their bilateral knee over the past Several years. The patient denies relevant history of injury/aggravation.  Patient did play football as a young man but does not recall any major injuries.  The right knee is notably worse.  Pain is located medially and  anteriorly Associated symptoms include pseudolocking and gelling.  Symptoms are aggravated by exercise. They have been treated with over the counter analgesics, NSAIDS, steroid injection(s) and activity modification.   Symptoms have recently worsened. Ambulation reportedly has been impaired. Self care ADLs are not painful.     Review of Systems   Constitution: Negative for fever and weight loss.   HENT: Negative for congestion.    Eyes: Negative for visual disturbance.   Cardiovascular: Negative for chest pain.   Respiratory: Negative for shortness of breath.    Hematologic/Lymphatic: Negative for bleeding problem. Does not bruise/bleed easily.   Skin: Negative for poor wound healing.   Musculoskeletal: Positive for joint pain and joint swelling.   Gastrointestinal: Negative for abdominal pain.   Genitourinary: Negative for dysuria.   Neurological: Negative for seizures.   Psychiatric/Behavioral: Negative for altered mental status.   Allergic/Immunologic: Negative for persistent infections.         Objective:      Ortho/SPM Exam      Right knee    [unfilled]    The patient is not in acute distress.   Sclerae normal  Body habitus is athletic.  Respiratory distress:  none   The patient walks with a limp.  Hip irritability  negative.   The skin over the knee is intact.  Knee effusion 1+  Tendernes is located medially  Range of motion- Flexion 120 deg, Extension 3 deg,   Ligament laxity exam:   MCL 1+   Lachman 0   Post sag  0    LCL 0  Patellar apprehension negative.  Popliteal cyst negative  Patellar crepitation  present.  Flexion/pinch negative  Pulses DP present, PT present.  Motor normal 5/5 strength in all tested muscle groups.   Sensory normal.    Left knee is identical    I reviewed the relevant imaging for the patient's condition:  Both knees have advanced medial narrowing with osteophytes and patellofemoral degeneration.    Assessment:       No diagnosis found.         The condition is structurally advanced.  Patient has significant pain and limitations of appropriate activities.  Extensive nonsurgical measures have not been adequate.  Plan:       There are no diagnoses linked to this encounter.    I explained my diagnostic impression and the reasoning behind it in detail, using layman's terms.  Models and/or pictures were used to help in the explanation.    Treatment options were discussed. The surgical process of right knee replacement was discussed in detail with the patient including a detailed discussion of the procedure itself (including visual model, x-ray review, and literature review). The typical perioperative and post-operative course was discussed and perioperative risks were discussed to the patient's satisfaction.  Risks and complications discussed included but were not limited to the risks of anesthetic complications, infection, bleeding, wound healing complications, stiffness, aseptic loosening, instability, limb length inequality, neurologic dysfunction including numbness and weakness, additional surgery,  DVT, pulmonary embolism, perioperative medical risks (cardiac, pulmonary, renal, neurologic), and death and the patient elects to proceed.      Left knee arthroplasty could be considered after the patient recovers from the 1st procedure.  I explained this.

## 2020-08-12 DIAGNOSIS — M17.0 PRIMARY OSTEOARTHRITIS OF BOTH KNEES: ICD-10-CM

## 2020-08-12 DIAGNOSIS — Z01.818 PRE-OP TESTING: Primary | ICD-10-CM

## 2020-09-30 ENCOUNTER — TELEPHONE (OUTPATIENT)
Dept: ORTHOPEDICS | Facility: CLINIC | Age: 60
End: 2020-09-30

## 2020-09-30 DIAGNOSIS — Z96.651 STATUS POST TOTAL RIGHT KNEE REPLACEMENT: ICD-10-CM

## 2020-09-30 DIAGNOSIS — Z41.9 ELECTIVE SURGERY: Primary | ICD-10-CM

## 2020-09-30 NOTE — TELEPHONE ENCOUNTER
I am not certain why the patient does not have a date yet.  If he will agree to any available date, I can put in the case request.    He does not need to see me in the office to schedule the procedure, as we have already discussed it sufficiently.

## 2020-09-30 NOTE — TELEPHONE ENCOUNTER
Spoke with  Jaya, he states at his last ov someone was suppose to contact him re: scheduling surgery. He also stated he signed consents at ov. Not in chart.

## 2020-09-30 NOTE — TELEPHONE ENCOUNTER
----- Message from Kendy Fonseca sent at 9/30/2020 10:30 AM CDT -----  Regarding: Surgery  Type:  Needs Medical Advice    Who Called:  patient  Would the patient rather a call back or a response via [facility]?  Call back  Best Call Back Number:  [phone]  Additional Information:  needs to know the status of his surgery as no one called since his August Xray

## 2020-10-01 DIAGNOSIS — M17.11 PRIMARY OSTEOARTHRITIS OF RIGHT KNEE: Primary | ICD-10-CM

## 2020-10-01 NOTE — PROGRESS NOTES
CC: Right knee pain    Robert Lake is a 60 y.o. male here today for a pre-operative visit in preparation for a Right total knee arthroplasty to be performed by Dr. Cadet on 10/26/20.     Robert Lake has a chronic history of Right knee pain. Pain is worse with activity and weight bearing. Patient has experienced interference of activities of daily living due to decreased range of motion and an increase in joint pain and swelling. Patient has failed non-operative treatment including NSAIDs, corticosteroid injections, and activity modification. Robert Lake currently ambulates with no assistive devices.     he was last seen and treated in the clinic on 8/11/2020. he will be medically optimized by the pre op center. There has been no significant change in medical status since last visit. No fever, chills, malaise, or unexplained weight change.      History of HTN, hyperlipidemia, and ANA.     History reviewed. No pertinent past medical history.    History reviewed. No pertinent surgical history.    History reviewed. No pertinent family history.    Review of patient's allergies indicates:  No Known Allergies      Current Outpatient Medications:     amLODIPine (NORVASC) 5 MG tablet, TK 1 T PO QD, Disp: , Rfl: 3    atorvastatin (LIPITOR) 40 MG tablet, TK 1 T PO  QHS, Disp: , Rfl: 3  No current facility-administered medications for this visit.     Review of Systems:  Constitutional: no fever or chills  Eyes: no visual changes  ENT: no nasal congestion or sore throat  Respiratory: no cough or shortness of breath  Cardiovascular: no chest pain or palpitations  Gastrointestinal: no nausea or vomiting, tolerating diet  Genitourinary: no hematuria or dysuria  Integument/Breast: no rash or pruritis  Hematologic/Lymphatic: no easy bruising or lymphadenopathy  Musculoskeletal: see HPI  Neurological: no seizures or tremors  Behavioral/Psych: no auditory or visual hallucinations  Endocrine: no heat or cold  intolerance    PE:  /80   Pulse 80   Resp 20   Wt 76.7 kg (169 lb)   BMI 25.32 kg/m²   General: Pleasant, cooperative, NAD   Gait: antalgic  HEENT: Normocephalic/Atraumatic, sclera nonicteric   Lungs: Respirations clear bilaterally; equal and unlabored.   CV: S1S2, 2+ bilateral upper and lower extremity pulses.   Skin: Intact throughout with no rashes, erythema, or lesions  Extremities: No LE edema,  no erythema or warmth of the skin in either lower extremity.    Body habitus is athletic.  The patient walks with a limp.    Right knee exam:  Hip irritability  negative.   The skin over the knee is intact.  Knee effusion 1+  Tendernes is located medially  Range of motion- Flexion 120 deg, Extension 3 deg,     Ligament laxity exam:   MCL 1+   Lachman 0   Post sag  0    LCL 0    Patellar apprehension negative.  Popliteal cyst negative  Patellar crepitation present.  Flexion/pinch negative    Pulses DP present, PT present.  Motor normal 5/5 strength in all tested muscle groups.   Sensory normal.    Radiographs: Radiographs reveal advanced medial narrowing with osteophytes and patellofemoral degeneration    Diagnosis: osteoarthritis Right knee    Plan: Right total knee arthroplasty on 10/26/20.    Pre-op labs to be done including CBC with diff and CMP. Pre-op EKG to be done as well.     Patient will be contacted by [facility] and by anesthesia for pre-op visits. Patient will be screened for covid within 72 hours prior to surgery and will be screened for symptoms of covid up to 14 days postop.     Okay to go home the evening of surgery if he is doing well.     Okay to use crutches instead of a walker.     Patient has 2 week postop scheduled with Dr. Cadet on 11/10/20.

## 2020-10-06 ENCOUNTER — OFFICE VISIT (OUTPATIENT)
Dept: ORTHOPEDICS | Facility: CLINIC | Age: 60
End: 2020-10-06
Payer: MEDICAID

## 2020-10-06 VITALS
RESPIRATION RATE: 20 BRPM | WEIGHT: 169 LBS | DIASTOLIC BLOOD PRESSURE: 80 MMHG | BODY MASS INDEX: 25.32 KG/M2 | HEART RATE: 80 BPM | SYSTOLIC BLOOD PRESSURE: 132 MMHG

## 2020-10-06 DIAGNOSIS — M17.11 PRIMARY OSTEOARTHRITIS OF RIGHT KNEE: Primary | ICD-10-CM

## 2020-10-06 PROCEDURE — 99999 PR PBB SHADOW E&M-EST. PATIENT-LVL III: ICD-10-PCS | Mod: PBBFAC,,, | Performed by: PHYSICIAN ASSISTANT

## 2020-10-06 PROCEDURE — 99213 OFFICE O/P EST LOW 20 MIN: CPT | Mod: PBBFAC,PN | Performed by: PHYSICIAN ASSISTANT

## 2020-10-06 PROCEDURE — 99499 NO LOS: ICD-10-PCS | Mod: S$PBB,,, | Performed by: PHYSICIAN ASSISTANT

## 2020-10-06 PROCEDURE — 99499 UNLISTED E&M SERVICE: CPT | Mod: S$PBB,,, | Performed by: PHYSICIAN ASSISTANT

## 2020-10-06 PROCEDURE — 99999 PR PBB SHADOW E&M-EST. PATIENT-LVL III: CPT | Mod: PBBFAC,,, | Performed by: PHYSICIAN ASSISTANT

## 2020-10-23 ENCOUNTER — LAB VISIT (OUTPATIENT)
Dept: FAMILY MEDICINE | Facility: CLINIC | Age: 60
End: 2020-10-23
Payer: MEDICAID

## 2020-10-23 DIAGNOSIS — Z41.9 ELECTIVE SURGERY: ICD-10-CM

## 2020-10-23 PROCEDURE — U0003 INFECTIOUS AGENT DETECTION BY NUCLEIC ACID (DNA OR RNA); SEVERE ACUTE RESPIRATORY SYNDROME CORONAVIRUS 2 (SARS-COV-2) (CORONAVIRUS DISEASE [COVID-19]), AMPLIFIED PROBE TECHNIQUE, MAKING USE OF HIGH THROUGHPUT TECHNOLOGIES AS DESCRIBED BY CMS-2020-01-R: HCPCS

## 2020-10-24 LAB — SARS-COV-2 RNA RESP QL NAA+PROBE: NOT DETECTED

## 2020-10-26 PROBLEM — Z96.651 HISTORY OF RIGHT KNEE JOINT REPLACEMENT: Status: ACTIVE | Noted: 2020-10-26

## 2020-10-26 PROBLEM — M17.11 PRIMARY OSTEOARTHRITIS OF RIGHT KNEE: Status: ACTIVE | Noted: 2020-10-26

## 2020-10-27 ENCOUNTER — TELEPHONE (OUTPATIENT)
Dept: ORTHOPEDICS | Facility: CLINIC | Age: 60
End: 2020-10-27

## 2020-10-27 NOTE — TELEPHONE ENCOUNTER
----- Message from Adriana Suazo sent at 10/27/2020  9:07 AM CDT -----  Contact: [phone] SELF  Patient would like to speak with you about needing a PA for his pain medication and he is having severe pain and wants the message sent on high alert. Please advise

## 2020-10-27 NOTE — TELEPHONE ENCOUNTER
----- Message from Adriana Suazo sent at 10/27/2020  9:07 AM CDT -----  Contact: [phone] SELF  Patient would like to speak with you about needing a PA for his pain medication and he is having severe pain and wants the message sent on high alert. Please advise

## 2020-11-10 ENCOUNTER — OFFICE VISIT (OUTPATIENT)
Dept: ORTHOPEDICS | Facility: CLINIC | Age: 60
End: 2020-11-10
Payer: MEDICAID

## 2020-11-10 VITALS — BODY MASS INDEX: 25.62 KG/M2 | WEIGHT: 169.06 LBS | HEIGHT: 68 IN

## 2020-11-10 DIAGNOSIS — Z96.651 STATUS POST TOTAL RIGHT KNEE REPLACEMENT: ICD-10-CM

## 2020-11-10 DIAGNOSIS — M17.11 PRIMARY OSTEOARTHRITIS OF RIGHT KNEE: Primary | ICD-10-CM

## 2020-11-10 PROCEDURE — 99999 PR PBB SHADOW E&M-EST. PATIENT-LVL III: ICD-10-PCS | Mod: PBBFAC,,, | Performed by: ORTHOPAEDIC SURGERY

## 2020-11-10 PROCEDURE — 99213 OFFICE O/P EST LOW 20 MIN: CPT | Mod: PBBFAC,PN | Performed by: ORTHOPAEDIC SURGERY

## 2020-11-10 PROCEDURE — 99024 PR POST-OP FOLLOW-UP VISIT: ICD-10-PCS | Mod: ,,, | Performed by: ORTHOPAEDIC SURGERY

## 2020-11-10 PROCEDURE — 99024 POSTOP FOLLOW-UP VISIT: CPT | Mod: ,,, | Performed by: ORTHOPAEDIC SURGERY

## 2020-11-10 PROCEDURE — 99999 PR PBB SHADOW E&M-EST. PATIENT-LVL III: CPT | Mod: PBBFAC,,, | Performed by: ORTHOPAEDIC SURGERY

## 2020-11-10 RX ORDER — AMLODIPINE BESYLATE 5 MG/1
TABLET ORAL
COMMUNITY
Start: 2020-11-03

## 2020-11-10 RX ORDER — HYDROCODONE BITARTRATE AND ACETAMINOPHEN 10; 325 MG/1; MG/1
1 TABLET ORAL EVERY 8 HOURS PRN
Qty: 45 TABLET | Refills: 0 | Status: SHIPPED | OUTPATIENT
Start: 2020-11-10 | End: 2020-12-01

## 2020-11-10 RX ORDER — ATORVASTATIN CALCIUM 40 MG/1
TABLET, FILM COATED ORAL
COMMUNITY
Start: 2020-11-02

## 2020-11-10 RX ORDER — NITROGLYCERIN 0.4 MG/1
TABLET SUBLINGUAL
COMMUNITY
Start: 2020-09-02

## 2020-11-10 NOTE — PROGRESS NOTES
"Subjective:      Patient ID: Robert Lake is a 60 y.o. male.    Chief Complaint: Post-op Evaluation (2 wk s/p right TKA )      HPI:  Two weeks postop  The patient is seen for postop follow-up of right  TKA.  Pain control has been Problematic after finishing postop prescription  They feel that they are ambulating with difficulty  Preoperative complaints include:  Swelling      Current Outpatient Medications:     amLODIPine (NORVASC) 5 MG tablet, TK 1 T PO QD, Disp: , Rfl:     aspirin (ECOTRIN) 81 MG EC tablet, Take 1 tablet (81 mg total) by mouth once daily., Disp: 42 tablet, Rfl: 0    atorvastatin (LIPITOR) 40 MG tablet, TK 1 T PO QHS, Disp: , Rfl:     HYDROcodone-acetaminophen (NORCO)  mg per tablet, Take 1 tablet by mouth every 8 (eight) hours as needed for Pain., Disp: 45 tablet, Rfl: 0    nitroGLYCERIN (NITROSTAT) 0.4 MG SL tablet, DIS 1 T UNT Q 5 MIN PRF CHEST PAIN, Disp: , Rfl:     oxyCODONE-acetaminophen (PERCOCET) 5-325 mg per tablet, Take 1 tablet by mouth every 6 (six) hours as needed for Pain. (Patient not taking: Reported on 11/10/2020), Disp: 45 tablet, Rfl: 0  Review of patient's allergies indicates:  No Known Allergies    Ht 5' 8" (1.727 m)   Wt 76.7 kg (169 lb 1.5 oz)   BMI 25.71 kg/m²     ROS        Objective:    Ortho Exam          Alert, oriented, no acute distress  Sclera-Normal  Respiratory distress-none  Gait mild  Incision:  Normally healed  Range of motion: extension- 5 degrees; flexion- 85 degrees  Valgus/varus stability- stable  Swelling-moderate  Distal perfusion-intact  Distal neurologic-intact    Imaging:  Postop radiographs show well-positioned right knee replacement without complicating process    Assessment:             1. Primary osteoarthritis of right knee    2. Status post total right knee replacement        The patient appears to be recovering normally; his swelling is modestly more than average, but not alarming.        Plan:       Orders Placed This Encounter "    HYDROcodone-acetaminophen (NORCO)  mg per tablet     No follow-ups on file.    Explained my assessment    Icing and elevation recommended and explained    Home exercise program demonstrated    Physical therapy

## 2020-12-01 ENCOUNTER — OFFICE VISIT (OUTPATIENT)
Dept: ORTHOPEDICS | Facility: CLINIC | Age: 60
End: 2020-12-01
Payer: MEDICAID

## 2020-12-01 VITALS
DIASTOLIC BLOOD PRESSURE: 82 MMHG | BODY MASS INDEX: 25.62 KG/M2 | HEIGHT: 68 IN | WEIGHT: 169.06 LBS | SYSTOLIC BLOOD PRESSURE: 126 MMHG

## 2020-12-01 DIAGNOSIS — Z96.651 STATUS POST TOTAL RIGHT KNEE REPLACEMENT: ICD-10-CM

## 2020-12-01 DIAGNOSIS — M17.11 PRIMARY OSTEOARTHRITIS OF RIGHT KNEE: Primary | ICD-10-CM

## 2020-12-01 PROCEDURE — 99024 POSTOP FOLLOW-UP VISIT: CPT | Mod: S$PBB,,, | Performed by: ORTHOPAEDIC SURGERY

## 2020-12-01 PROCEDURE — 99024 PR POST-OP FOLLOW-UP VISIT: ICD-10-PCS | Mod: S$PBB,,, | Performed by: ORTHOPAEDIC SURGERY

## 2020-12-01 PROCEDURE — 99999 PR PBB SHADOW E&M-EST. PATIENT-LVL III: CPT | Mod: PBBFAC,,, | Performed by: ORTHOPAEDIC SURGERY

## 2020-12-01 PROCEDURE — 99999 PR PBB SHADOW E&M-EST. PATIENT-LVL III: ICD-10-PCS | Mod: PBBFAC,,, | Performed by: ORTHOPAEDIC SURGERY

## 2020-12-01 PROCEDURE — 99213 OFFICE O/P EST LOW 20 MIN: CPT | Mod: PBBFAC,PN | Performed by: ORTHOPAEDIC SURGERY

## 2020-12-01 RX ORDER — HYDROCODONE BITARTRATE AND ACETAMINOPHEN 10; 325 MG/1; MG/1
1 TABLET ORAL EVERY 12 HOURS PRN
Qty: 45 TABLET | Refills: 0 | Status: SHIPPED | OUTPATIENT
Start: 2020-12-01 | End: 2021-04-13 | Stop reason: ALTCHOICE

## 2020-12-01 RX ORDER — PREGABALIN 150 MG/1
150 CAPSULE ORAL NIGHTLY
Qty: 30 CAPSULE | Refills: 1 | Status: SHIPPED | OUTPATIENT
Start: 2020-12-01 | End: 2020-12-01 | Stop reason: SDUPTHER

## 2020-12-01 RX ORDER — PREGABALIN 150 MG/1
150 CAPSULE ORAL NIGHTLY
Qty: 30 CAPSULE | Refills: 1 | Status: SHIPPED | OUTPATIENT
Start: 2020-12-01 | End: 2021-06-01

## 2020-12-01 RX ORDER — HYDROCODONE BITARTRATE AND ACETAMINOPHEN 10; 325 MG/1; MG/1
1 TABLET ORAL EVERY 12 HOURS PRN
Qty: 45 TABLET | Refills: 0 | Status: SHIPPED | OUTPATIENT
Start: 2020-12-01 | End: 2020-12-01 | Stop reason: SDUPTHER

## 2020-12-01 NOTE — PATIENT INSTRUCTIONS
Please contact [facility], Outpatient Rehab at [phone] to reschedule your physical therapy appointment with Steph Crawley PT.

## 2020-12-01 NOTE — PROGRESS NOTES
"Subjective:      Patient ID: Robert Lake is a 60 y.o. male.    Chief Complaint: Post-op Evaluation (5 wk s/p right TKA )      HPI:  Of weeks postop  The patient is seen for postop follow-up of right  TKA.  Pain control has been satisfactory  They feel that they are ambulating easily  Preoperative complaints include:  Pain at night, swelling      Current Outpatient Medications:     amLODIPine (NORVASC) 5 MG tablet, TK 1 T PO QD, Disp: , Rfl:     aspirin (ECOTRIN) 81 MG EC tablet, Take 1 tablet (81 mg total) by mouth once daily., Disp: 42 tablet, Rfl: 0    atorvastatin (LIPITOR) 40 MG tablet, TK 1 T PO QHS, Disp: , Rfl:     HYDROcodone-acetaminophen (NORCO)  mg per tablet, Take 1 tablet by mouth every 8 (eight) hours as needed for Pain., Disp: 45 tablet, Rfl: 0    oxyCODONE-acetaminophen (PERCOCET) 5-325 mg per tablet, Take 1 tablet by mouth every 6 (six) hours as needed for Pain., Disp: 45 tablet, Rfl: 0    nitroGLYCERIN (NITROSTAT) 0.4 MG SL tablet, DIS 1 T UNT Q 5 MIN PRF CHEST PAIN, Disp: , Rfl:   Review of patient's allergies indicates:  No Known Allergies    /82 (BP Location: Left arm, Patient Position: Sitting, BP Method: Medium (Manual))   Ht 5' 8" (1.727 m)   Wt 76.7 kg (169 lb 1.5 oz)   BMI 25.71 kg/m²     Review of Systems   Constitution: Negative for fever and weight loss.   HENT: Negative for congestion.    Eyes: Negative for visual disturbance.   Cardiovascular: Negative for chest pain.   Respiratory: Negative for shortness of breath.    Hematologic/Lymphatic: Negative for bleeding problem. Does not bruise/bleed easily.   Skin: Negative for poor wound healing.   Musculoskeletal: Positive for joint pain and joint swelling.   Gastrointestinal: Negative for abdominal pain.   Genitourinary: Negative for dysuria.   Neurological: Negative for seizures.   Psychiatric/Behavioral: Negative for altered mental status.   Allergic/Immunologic: Negative for persistent infections.       "     Objective:    Ortho Exam        Right knee  Alert, oriented, no acute distress  Sclera-Normal  Respiratory distress-none  Gait mild limp  Incision:  Normally healed  Range of motion: extension- 5 degrees; flexion- 100 degrees  Valgus/varus stability- stable  Swelling-mild  Distal perfusion-intact  Distal neurologic-intact    Imaging:  None today    Assessment:             1. Primary osteoarthritis of right knee    2. Status post total right knee replacement        Postop progress is now at normal level.  Patient still needs to work on range of motion.        Plan:          No follow-ups on file.    I explained my assessment    Small Vicodin refill-no more after today    Lyrica for night pain    Physical therapy    Home exercises demonstrated

## 2020-12-10 PROBLEM — M25.661 DECREASED ROM OF RIGHT KNEE: Status: ACTIVE | Noted: 2020-12-10

## 2020-12-10 PROBLEM — R26.9 GAIT ABNORMALITY: Status: ACTIVE | Noted: 2020-12-10

## 2021-01-06 ENCOUNTER — TELEPHONE (OUTPATIENT)
Dept: ORTHOPEDICS | Facility: CLINIC | Age: 61
End: 2021-01-06

## 2021-01-12 ENCOUNTER — OFFICE VISIT (OUTPATIENT)
Dept: ORTHOPEDICS | Facility: CLINIC | Age: 61
End: 2021-01-12
Payer: MEDICAID

## 2021-01-12 VITALS — HEIGHT: 68 IN | BODY MASS INDEX: 25.62 KG/M2 | WEIGHT: 169.06 LBS

## 2021-01-12 DIAGNOSIS — M17.11 PRIMARY OSTEOARTHRITIS OF RIGHT KNEE: Primary | ICD-10-CM

## 2021-01-12 DIAGNOSIS — Z96.651 STATUS POST TOTAL RIGHT KNEE REPLACEMENT: ICD-10-CM

## 2021-01-12 PROCEDURE — 99999 PR PBB SHADOW E&M-EST. PATIENT-LVL III: ICD-10-PCS | Mod: PBBFAC,,, | Performed by: ORTHOPAEDIC SURGERY

## 2021-01-12 PROCEDURE — 99024 PR POST-OP FOLLOW-UP VISIT: ICD-10-PCS | Mod: S$PBB,,, | Performed by: ORTHOPAEDIC SURGERY

## 2021-01-12 PROCEDURE — 99999 PR PBB SHADOW E&M-EST. PATIENT-LVL III: CPT | Mod: PBBFAC,,, | Performed by: ORTHOPAEDIC SURGERY

## 2021-01-12 PROCEDURE — 99213 OFFICE O/P EST LOW 20 MIN: CPT | Mod: PBBFAC,PN | Performed by: ORTHOPAEDIC SURGERY

## 2021-01-12 PROCEDURE — 99024 POSTOP FOLLOW-UP VISIT: CPT | Mod: S$PBB,,, | Performed by: ORTHOPAEDIC SURGERY

## 2021-01-12 RX ORDER — NAPROXEN 500 MG/1
500 TABLET ORAL 2 TIMES DAILY WITH MEALS
Qty: 60 TABLET | Refills: 1 | Status: SHIPPED | OUTPATIENT
Start: 2021-01-12

## 2021-04-13 ENCOUNTER — OFFICE VISIT (OUTPATIENT)
Dept: ORTHOPEDICS | Facility: CLINIC | Age: 61
End: 2021-04-13
Payer: MEDICAID

## 2021-04-13 VITALS — WEIGHT: 169.06 LBS | BODY MASS INDEX: 25.62 KG/M2 | HEIGHT: 68 IN

## 2021-04-13 DIAGNOSIS — M17.0 PRIMARY OSTEOARTHRITIS OF BOTH KNEES: Primary | ICD-10-CM

## 2021-04-13 DIAGNOSIS — Z96.651 STATUS POST TOTAL RIGHT KNEE REPLACEMENT: ICD-10-CM

## 2021-04-13 PROCEDURE — 99213 OFFICE O/P EST LOW 20 MIN: CPT | Mod: PBBFAC,PN | Performed by: ORTHOPAEDIC SURGERY

## 2021-04-13 PROCEDURE — 99999 PR PBB SHADOW E&M-EST. PATIENT-LVL III: ICD-10-PCS | Mod: PBBFAC,,, | Performed by: ORTHOPAEDIC SURGERY

## 2021-04-13 PROCEDURE — 99212 PR OFFICE/OUTPT VISIT, EST, LEVL II, 10-19 MIN: ICD-10-PCS | Mod: S$PBB,,, | Performed by: ORTHOPAEDIC SURGERY

## 2021-04-13 PROCEDURE — 99999 PR PBB SHADOW E&M-EST. PATIENT-LVL III: CPT | Mod: PBBFAC,,, | Performed by: ORTHOPAEDIC SURGERY

## 2021-04-13 PROCEDURE — 99212 OFFICE O/P EST SF 10 MIN: CPT | Mod: S$PBB,,, | Performed by: ORTHOPAEDIC SURGERY

## 2021-04-13 RX ORDER — TIZANIDINE 4 MG/1
TABLET ORAL
COMMUNITY
End: 2021-04-13

## 2022-01-05 ENCOUNTER — HOSPITAL ENCOUNTER (EMERGENCY)
Facility: HOSPITAL | Age: 62
Discharge: LEFT BEFORE DISCHARGE | End: 2022-01-05
Attending: EMERGENCY MEDICINE
Payer: MEDICAID

## 2022-01-05 VITALS
HEART RATE: 72 BPM | RESPIRATION RATE: 20 BRPM | BODY MASS INDEX: 25.76 KG/M2 | OXYGEN SATURATION: 98 % | SYSTOLIC BLOOD PRESSURE: 128 MMHG | HEIGHT: 68 IN | DIASTOLIC BLOOD PRESSURE: 77 MMHG | WEIGHT: 170 LBS | TEMPERATURE: 99 F

## 2022-01-05 DIAGNOSIS — R11.2 NON-INTRACTABLE VOMITING WITH NAUSEA, UNSPECIFIED VOMITING TYPE: Primary | ICD-10-CM

## 2022-01-05 DIAGNOSIS — R10.31 RIGHT LOWER QUADRANT ABDOMINAL PAIN: ICD-10-CM

## 2022-01-05 DIAGNOSIS — R06.02 SHORTNESS OF BREATH: ICD-10-CM

## 2022-01-05 LAB
ALBUMIN SERPL BCP-MCNC: 3.7 G/DL (ref 3.5–5.2)
ALP SERPL-CCNC: 49 U/L (ref 55–135)
ALT SERPL W/O P-5'-P-CCNC: 10 U/L (ref 10–44)
ANION GAP SERPL CALC-SCNC: 8 MMOL/L (ref 8–16)
AST SERPL-CCNC: 22 U/L (ref 10–40)
BASOPHILS # BLD AUTO: 0.01 K/UL (ref 0–0.2)
BASOPHILS NFR BLD: 0.1 % (ref 0–1.9)
BILIRUB SERPL-MCNC: 0.3 MG/DL (ref 0.1–1)
BUN SERPL-MCNC: 13 MG/DL (ref 8–23)
CALCIUM SERPL-MCNC: 8.8 MG/DL (ref 8.7–10.5)
CHLORIDE SERPL-SCNC: 105 MMOL/L (ref 95–110)
CO2 SERPL-SCNC: 25 MMOL/L (ref 23–29)
CREAT SERPL-MCNC: 1 MG/DL (ref 0.5–1.4)
CTP QC/QA: YES
DIFFERENTIAL METHOD: ABNORMAL
EOSINOPHIL # BLD AUTO: 0.2 K/UL (ref 0–0.5)
EOSINOPHIL NFR BLD: 2.2 % (ref 0–8)
ERYTHROCYTE [DISTWIDTH] IN BLOOD BY AUTOMATED COUNT: 13.4 % (ref 11.5–14.5)
EST. GFR  (AFRICAN AMERICAN): >60 ML/MIN/1.73 M^2
EST. GFR  (NON AFRICAN AMERICAN): >60 ML/MIN/1.73 M^2
GLUCOSE SERPL-MCNC: 139 MG/DL (ref 70–110)
HCT VFR BLD AUTO: 32.8 % (ref 40–54)
HCV AB SERPL QL IA: NEGATIVE
HGB BLD-MCNC: 10.9 G/DL (ref 14–18)
HIV 1+2 AB+HIV1 P24 AG SERPL QL IA: NEGATIVE
IMM GRANULOCYTES # BLD AUTO: 0.02 K/UL (ref 0–0.04)
IMM GRANULOCYTES NFR BLD AUTO: 0.3 % (ref 0–0.5)
LACTATE SERPL-SCNC: 2.5 MMOL/L (ref 0.5–2.2)
LIPASE SERPL-CCNC: 18 U/L (ref 4–60)
LYMPHOCYTES # BLD AUTO: 1.1 K/UL (ref 1–4.8)
LYMPHOCYTES NFR BLD: 16.4 % (ref 18–48)
MCH RBC QN AUTO: 31.8 PG (ref 27–31)
MCHC RBC AUTO-ENTMCNC: 33.2 G/DL (ref 32–36)
MCV RBC AUTO: 96 FL (ref 82–98)
MONOCYTES # BLD AUTO: 0.3 K/UL (ref 0.3–1)
MONOCYTES NFR BLD: 5 % (ref 4–15)
NEUTROPHILS # BLD AUTO: 5.1 K/UL (ref 1.8–7.7)
NEUTROPHILS NFR BLD: 76 % (ref 38–73)
NRBC BLD-RTO: 0 /100 WBC
PLATELET # BLD AUTO: 149 K/UL (ref 150–450)
PMV BLD AUTO: 10.2 FL (ref 9.2–12.9)
POTASSIUM SERPL-SCNC: 4 MMOL/L (ref 3.5–5.1)
PROT SERPL-MCNC: 7.1 G/DL (ref 6–8.4)
RBC # BLD AUTO: 3.43 M/UL (ref 4.6–6.2)
SARS-COV-2 RDRP RESP QL NAA+PROBE: NEGATIVE
SODIUM SERPL-SCNC: 138 MMOL/L (ref 136–145)
TROPONIN I SERPL DL<=0.01 NG/ML-MCNC: 0.01 NG/ML (ref 0–0.03)
WBC # BLD AUTO: 6.76 K/UL (ref 3.9–12.7)

## 2022-01-05 PROCEDURE — 86803 HEPATITIS C AB TEST: CPT | Performed by: EMERGENCY MEDICINE

## 2022-01-05 PROCEDURE — 63600175 PHARM REV CODE 636 W HCPCS: Performed by: STUDENT IN AN ORGANIZED HEALTH CARE EDUCATION/TRAINING PROGRAM

## 2022-01-05 PROCEDURE — 83690 ASSAY OF LIPASE: CPT | Performed by: STUDENT IN AN ORGANIZED HEALTH CARE EDUCATION/TRAINING PROGRAM

## 2022-01-05 PROCEDURE — 96374 THER/PROPH/DIAG INJ IV PUSH: CPT | Mod: 59

## 2022-01-05 PROCEDURE — 25500020 PHARM REV CODE 255: Performed by: EMERGENCY MEDICINE

## 2022-01-05 PROCEDURE — 85025 COMPLETE CBC W/AUTO DIFF WBC: CPT | Performed by: STUDENT IN AN ORGANIZED HEALTH CARE EDUCATION/TRAINING PROGRAM

## 2022-01-05 PROCEDURE — 80053 COMPREHEN METABOLIC PANEL: CPT | Performed by: STUDENT IN AN ORGANIZED HEALTH CARE EDUCATION/TRAINING PROGRAM

## 2022-01-05 PROCEDURE — 99285 PR EMERGENCY DEPT VISIT,LEVEL V: ICD-10-PCS | Mod: ,,, | Performed by: EMERGENCY MEDICINE

## 2022-01-05 PROCEDURE — 96375 TX/PRO/DX INJ NEW DRUG ADDON: CPT

## 2022-01-05 PROCEDURE — 99285 EMERGENCY DEPT VISIT HI MDM: CPT | Mod: 25

## 2022-01-05 PROCEDURE — 99285 EMERGENCY DEPT VISIT HI MDM: CPT | Mod: ,,, | Performed by: EMERGENCY MEDICINE

## 2022-01-05 PROCEDURE — 83605 ASSAY OF LACTIC ACID: CPT | Performed by: STUDENT IN AN ORGANIZED HEALTH CARE EDUCATION/TRAINING PROGRAM

## 2022-01-05 PROCEDURE — U0002 COVID-19 LAB TEST NON-CDC: HCPCS | Performed by: STUDENT IN AN ORGANIZED HEALTH CARE EDUCATION/TRAINING PROGRAM

## 2022-01-05 PROCEDURE — 87389 HIV-1 AG W/HIV-1&-2 AB AG IA: CPT | Performed by: EMERGENCY MEDICINE

## 2022-01-05 PROCEDURE — 84484 ASSAY OF TROPONIN QUANT: CPT | Performed by: STUDENT IN AN ORGANIZED HEALTH CARE EDUCATION/TRAINING PROGRAM

## 2022-01-05 RX ORDER — ONDANSETRON 4 MG/1
4 TABLET, ORALLY DISINTEGRATING ORAL EVERY 8 HOURS PRN
Qty: 14 TABLET | Refills: 0 | Status: SHIPPED | OUTPATIENT
Start: 2022-01-05

## 2022-01-05 RX ORDER — ONDANSETRON 2 MG/ML
4 INJECTION INTRAMUSCULAR; INTRAVENOUS
Status: COMPLETED | OUTPATIENT
Start: 2022-01-05 | End: 2022-01-05

## 2022-01-05 RX ORDER — MORPHINE SULFATE 4 MG/ML
4 INJECTION, SOLUTION INTRAMUSCULAR; INTRAVENOUS
Status: COMPLETED | OUTPATIENT
Start: 2022-01-05 | End: 2022-01-05

## 2022-01-05 RX ADMIN — MORPHINE SULFATE 4 MG: 4 INJECTION INTRAVENOUS at 05:01

## 2022-01-05 RX ADMIN — IOHEXOL 75 ML: 350 INJECTION, SOLUTION INTRAVENOUS at 06:01

## 2022-01-05 RX ADMIN — SODIUM CHLORIDE, SODIUM LACTATE, POTASSIUM CHLORIDE, AND CALCIUM CHLORIDE 1000 ML: .6; .31; .03; .02 INJECTION, SOLUTION INTRAVENOUS at 04:01

## 2022-01-05 RX ADMIN — ONDANSETRON 4 MG: 2 INJECTION INTRAMUSCULAR; INTRAVENOUS at 05:01

## 2022-01-05 NOTE — ED NOTES
"Pt reports feeling better at this time. Pt states " I moved around for that scan and then I went to the bathroom. I passed everything. I feel so much better and I'm ready to go." Notified MD of pts request for discharge.   "

## 2022-01-05 NOTE — ED NOTES
"Pt remains unable to provide urine sample. Pt states " Oh man when I went to the bathroom I forgot to take that cup. I'm sorry I'll try again." Urinal at bedside with sample cup.   "

## 2022-01-05 NOTE — ED PROVIDER NOTES
"Encounter Date: 1/5/2022       History     Chief Complaint   Patient presents with    Nausea    Vomiting    Abdominal Pain     Ate "bad cabbage" a few hours ago and 2 episodes of emesis and feels like he has to have BM     HPI   Patient is a 61-year-old male with past medical history of hypertension, hyperlipidemia, and chronic back pain presenting with acute onset nausea and vomiting for the past 2 hours.  Also having right flank pain radiating to his groin and back.  Associated with shortness of breath.  No fevers.  No recent cough or congestion.  No recent bowel movements and denies history of diarrhea. Patient concerned the symptoms are due to the cabbage he ate "a few hours" before the N/V. Sister prepared the dish and he is unsure if she got sick as well.     Review of patient's allergies indicates:  No Known Allergies  Past Medical History:   Diagnosis Date    Hypertension      Past Surgical History:   Procedure Laterality Date    FRACTURE SURGERY Right     ankle    HERNIA REPAIR      KNEE ARTHROPLASTY Right 10/26/2020    Procedure: ARTHROPLASTY, KNEE;  Surgeon: Virgilio Cadet MD;  Location: [facility];  Service: Orthopedics;  Laterality: Right;     No family history on file.  Social History     Tobacco Use    Smoking status: Never Smoker    Smokeless tobacco: Never Used   Substance Use Topics    Alcohol use: Never    Drug use: Never     Review of Systems   Constitutional: Positive for chills. Negative for fever.   HENT: Negative for congestion and sore throat.    Respiratory: Positive for shortness of breath. Negative for cough.    Cardiovascular: Negative for chest pain and leg swelling.   Gastrointestinal: Positive for abdominal pain, constipation, nausea and vomiting.   Genitourinary: Negative for dysuria and hematuria.   Musculoskeletal: Positive for back pain. Negative for gait problem.   Skin: Negative for rash and wound.   Neurological: Positive for headaches. Negative for weakness. "   Hematological: Does not bruise/bleed easily.       Physical Exam     Initial Vitals [01/05/22 0425]   BP Pulse Resp Temp SpO2   (!) 140/80 72 16 98.1 °F (36.7 °C) 100 %      MAP       --         Physical Exam    Nursing note and vitals reviewed.  Constitutional: He appears well-developed. He appears distressed.   Adult male alternating between writhing in gurney, unable to find a position of comfort and lying back in no obvious distress during initial interview. Speaking in full sentences and moving all extremities.    HENT:   Head: Normocephalic.   Right Ear: External ear normal.   Left Ear: External ear normal.   Nose: Nose normal.   Eyes: Conjunctivae and EOM are normal. Pupils are equal, round, and reactive to light. No scleral icterus.   Neck: Neck supple. No JVD present.   Cardiovascular: Normal rate and intact distal pulses.   Pulmonary/Chest: Breath sounds normal. No stridor. No respiratory distress. He has no wheezes. He has no rales.   Abdominal: Abdomen is soft. Bowel sounds are normal. He exhibits no distension. There is abdominal tenderness (Tenderness in RLQ).   Musculoskeletal:         General: Normal range of motion.      Cervical back: Neck supple.     Neurological: He is alert and oriented to person, place, and time. GCS score is 15. GCS eye subscore is 4. GCS verbal subscore is 5. GCS motor subscore is 6.   Skin: Skin is warm. Capillary refill takes less than 2 seconds.   Psychiatric: He has a normal mood and affect.         ED Course   Procedures  Labs Reviewed   CBC W/ AUTO DIFFERENTIAL - Abnormal; Notable for the following components:       Result Value    RBC 3.43 (*)     Hemoglobin 10.9 (*)     Hematocrit 32.8 (*)     MCH 31.8 (*)     Platelets 149 (*)     Gran % 76.0 (*)     Lymph % 16.4 (*)     All other components within normal limits    Narrative:     Release to patient->Immediate   COMPREHENSIVE METABOLIC PANEL - Abnormal; Notable for the following components:    Glucose 139 (*)      Alkaline Phosphatase 49 (*)     All other components within normal limits    Narrative:     Release to patient->Immediate   LACTIC ACID, PLASMA - Abnormal; Notable for the following components:    Lactate (Lactic Acid) 2.5 (*)     All other components within normal limits    Narrative:     Release to patient->Immediate   LIPASE    Narrative:     Release to patient->Immediate   TROPONIN I    Narrative:     Release to patient->Immediate   HIV 1 / 2 ANTIBODY   HEPATITIS C ANTIBODY   URINALYSIS, REFLEX TO URINE CULTURE   SARS-COV-2 RDRP GENE          Imaging Results          CT Abdomen Pelvis With Contrast (In process)                X-Ray Chest 1 View (Final result)  Result time 01/05/22 06:50:35    Final result by Siva Rubin MD (01/05/22 06:50:35)                 Impression:      No obvious evidence of an acute pulmonary process.      Electronically signed by: Siva Rubin  Date:    01/05/2022  Time:    06:50             Narrative:    EXAMINATION:  XR CHEST 1 VIEW    CLINICAL HISTORY:  Shortness of breath    TECHNIQUE:  Single frontal view of the chest was performed.    COMPARISON:  July 1, 2008    FINDINGS:  Single frontal view of the chest indicates that the lungs are well aerated.  No indication of a consolidative process or pleural effusion.  No pulmonary nodule.  The heart is normal in size and contour.                                 Medications   lactated ringers bolus 1,000 mL ( Intravenous Canceled Entry 1/5/22 0654)   morphine injection 4 mg (4 mg Intravenous Given 1/5/22 0518)   ondansetron injection 4 mg (4 mg Intravenous Given 1/5/22 0518)   iohexoL (OMNIPAQUE 350) injection 75 mL (75 mLs Intravenous Given 1/5/22 0613)           APC / Resident Notes:   Patient is a 62yo male with PMH of HTN, HLD and cocaine use presenting with acute onset SOB, right flank pain radiating to the grion, N/V, and constipation. Patient associates this with cabbage he had for dinner.  No associated chest pain.  Denies  sick contacts.  No cough or congestion, fevers or chills, urinary symptoms, or history of trauma.  Elevated blood pressure in the setting of pain.  Normal heart sounds and lung sounds.  Abdomen is soft nondistended with right lower quadrant tenderness.  Concern for appendicitis, gastroenteritis, nephrolithiasis, pyelonephritis, UTI, hernia, colitis, psoas abscess, ACS, lower lobe pneumonia, COVID-19.  Ordered abdominal labs, troponin, EKG, chest x-ray, CT abdomen pelvis, and UA for workup.  For symptom control given IV fluids, morphine, and Zofran.  Disposition pending symptom control, labs, UA, and imaging.  Will continue to monitor pending workup.    Rena Saba MD  [facility], PGY4  01/05/2022 5:12 AM                 ED Course as of 01/05/22 0710   Wed Jan 05, 2022   0550 WBC: 6.76 [GM]   0550 Hemoglobin(!): 10.9 [GM]   0550 Lactate, Josias(!): 2.5 [GM]   0551 Troponin I: 0.009 [GM]   0551 Lipase: 18 [GM]   0551 BUN: 13 [GM]   0551 Creatinine: 1.0 [GM]   0659 Patient's UA and CT abdomen pelvis are still pending.  Patient continues to appear well at this time and states he feels better.  He is unable to stay for completion of his workup as he has to go home to be able to take care of his 1-year-old and 3-year-old child.  He understands that leaving at this time could lead to a missed diagnosis, worsening pain, worsening symptoms, and worsening of his medical condition up to and including death. Encouraged to return to the emergency department any time if he has worsening of his conditions.  Patient was alert and oriented with decision-making capacity when making the decision to leave against medical advice. [SJ]      ED Course User Index  [GM] Blanca Hall MD  [SJ] Rena Saba MD             Clinical Impression:   Final diagnoses:  [R06.02] Shortness of breath  [R11.2] Non-intractable vomiting with nausea, unspecified vomiting type (Primary)  [R10.31] Right lower quadrant abdominal pain           ED Disposition Condition    ANNAA               Rena Saba MD  Resident  01/05/22 0737

## 2022-01-05 NOTE — ED NOTES
Pt unable to provide urine specimen at this time. Urinal and sample cup at bedside. Pt verbalizes understanding of need for urine sample.

## 2022-01-05 NOTE — DISCHARGE INSTRUCTIONS
You can use Tylenol or Motrin for pain control as needed. DO NOT CONTINUE TO EAT THE CABBAGE THAT YOU THINK UPSET YOUR STOMACH. You can use zofran for any mild nausea as needed.      Stay hydrated and follow up with your primary care doctor after this visit.     Return to the Emergency Department for any new or worsening symptoms as your work-up for abdominal pain today was not complete prior to your decision to leave the hospital.    Lap band

## 2022-01-05 NOTE — ED NOTES
Pt arrives to ED bed 09 via [facility]. Pt complaining of abdominal pain at this time. Pt reports 10/10 pain on numerical pain scale. Location generalized abdomen. Pt reports pain radiates to lower back and groin area. Pt rpeorts quality as sharp and stabbing. Last bm 1/3/21. Pt reports being unable to have BM 1/5/21. Pt reporting n/v and denies diarrhea. Pt denies hematemesis. Pt denies fever/chills. Pt reports loss of appetite. BS x 4. Last meal tolerated 1/5/21 approx 0300. Pt reports eating cabbage. Pt denies urinary problems. Pt reporting bilateral flank pain. Awaiting MD orders/disposition. Bed rails up x 2 with bed locked in lowest position. Call light in reach. ED eval continues.

## 2022-01-12 NOTE — PROGRESS NOTES
History & Physical    SUBJECTIVE:     History of Present Illness:  Patient is a 59 y.o. male presents with right inguinal hernia with post op groin pain.    Chief Complaint   Patient presents with    Consult    Groin Pain       Review of patient's allergies indicates:  No Known Allergies    Current Outpatient Medications   Medication Sig Dispense Refill    gabapentin (NEURONTIN) 100 MG capsule TK 1 C PO QD ATN  1    meloxicam (MOBIC) 15 MG tablet TK 1 T PO QD PRF PAIN  4    amitriptyline (ELAVIL) 10 MG tablet Take 1 tablet (10 mg total) by mouth nightly as needed for Insomnia or Pain. 30 tablet 2    amLODIPine (NORVASC) 5 MG tablet TK 1 T PO QD  3    atorvastatin (LIPITOR) 40 MG tablet TK 1 T PO  QHS  3    HYDROcodone-acetaminophen (NORCO)  mg per tablet Take 1 tablet by mouth 2 (two) times daily as needed.  0    tiZANidine (ZANAFLEX) 4 MG tablet tizanidine 4 mg tablet       Current Facility-Administered Medications   Medication Dose Route Frequency Provider Last Rate Last Dose    triamcinolone acetonide injection 40 mg  40 mg Intra-articular 1 time in Clinic/HOD Pineda New DO        triamcinolone acetonide injection 40 mg  40 mg Intra-articular 1 time in Clinic/HOD Pineda New DO        triamcinolone acetonide injection 40 mg  40 mg Intra-articular 1 time in Clinic/HOD Pineda New DO           History reviewed. No pertinent past medical history.  History reviewed. No pertinent surgical history.  History reviewed. No pertinent family history.  Social History     Tobacco Use    Smoking status: Never Smoker    Smokeless tobacco: Never Used   Substance Use Topics    Alcohol use: Never     Frequency: Never    Drug use: Never        Review of Systems:  Review of Systems   Constitutional: Negative.    HENT: Negative.    Eyes: Negative.    Respiratory: Negative.    Cardiovascular: Negative.    Gastrointestinal: Negative.    Endocrine: Negative.    Musculoskeletal: Negative.    Skin:  "Negative.    Allergic/Immunologic: Negative.    Neurological: Negative.    Hematological: Negative.    Psychiatric/Behavioral: Negative.    All other systems reviewed and are negative.      OBJECTIVE:     Vital Signs (Most Recent)  Pulse: 71 (12/03/19 1508)  BP: (!) 154/85 (12/03/19 1508)  5' 8.5" (1.74 m)  77.9 kg (171 lb 10.1 oz)     Physical Exam:  Physical Exam   Constitutional: He is oriented to person, place, and time. He appears well-developed and well-nourished.   HENT:   Head: Normocephalic and atraumatic.   Right Ear: External ear normal.   Left Ear: External ear normal.   Nose: Nose normal.   Mouth/Throat: Oropharynx is clear and moist.   Eyes: Pupils are equal, round, and reactive to light. Conjunctivae and EOM are normal.   Neck: Normal range of motion. Neck supple.   Cardiovascular: Normal rate, regular rhythm, normal heart sounds and intact distal pulses.   Pulmonary/Chest: Effort normal and breath sounds normal.   Abdominal: Soft. Bowel sounds are normal.   Genitourinary:         Musculoskeletal: Normal range of motion.   Neurological: He is alert and oriented to person, place, and time. He has normal reflexes.   Skin: Skin is warm and dry.   Psychiatric: He has a normal mood and affect. His behavior is normal. Thought content normal.   Vitals reviewed.      Laboratory  none    Diagnostic Results:  none    ASSESSMENT/PLAN:     Right groin pain without recurrence    PLAN:Plan     I will get a CT scan and send him to pain management for an injection       " Left UE/Right UE

## 2024-09-09 ENCOUNTER — HOSPITAL ENCOUNTER (EMERGENCY)
Facility: HOSPITAL | Age: 64
Discharge: HOME | End: 2024-09-09
Attending: STUDENT IN AN ORGANIZED HEALTH CARE EDUCATION/TRAINING PROGRAM
Payer: MEDICAID

## 2024-09-09 VITALS
BODY MASS INDEX: 25.18 KG/M2 | WEIGHT: 170 LBS | SYSTOLIC BLOOD PRESSURE: 146 MMHG | TEMPERATURE: 99 F | HEART RATE: 55 BPM | OXYGEN SATURATION: 98 % | RESPIRATION RATE: 16 BRPM | DIASTOLIC BLOOD PRESSURE: 79 MMHG | HEIGHT: 69 IN

## 2024-09-09 DIAGNOSIS — K40.90 LEFT INGUINAL HERNIA: Primary | ICD-10-CM

## 2024-09-09 DIAGNOSIS — R10.9 ABDOMINAL PAIN: ICD-10-CM

## 2024-09-09 LAB
ALBUMIN SERPL BCP-MCNC: 4.3 G/DL (ref 3.5–5.2)
ALLENS TEST: NORMAL
ALP SERPL-CCNC: 49 U/L (ref 55–135)
ALT SERPL W/O P-5'-P-CCNC: 10 U/L (ref 10–44)
ANION GAP SERPL CALC-SCNC: 6 MMOL/L (ref 8–16)
AST SERPL-CCNC: 22 U/L (ref 10–40)
BASOPHILS # BLD AUTO: 0.01 K/UL (ref 0–0.2)
BASOPHILS NFR BLD: 0.3 % (ref 0–1.9)
BILIRUB SERPL-MCNC: 0.5 MG/DL (ref 0.1–1)
BILIRUB UR QL STRIP: NEGATIVE
BUN SERPL-MCNC: 11 MG/DL (ref 6–30)
BUN SERPL-MCNC: 11 MG/DL (ref 8–23)
CALCIUM SERPL-MCNC: 10.3 MG/DL (ref 8.7–10.5)
CHLORIDE SERPL-SCNC: 104 MMOL/L (ref 95–110)
CHLORIDE SERPL-SCNC: 105 MMOL/L (ref 95–110)
CLARITY UR REFRACT.AUTO: CLEAR
CO2 SERPL-SCNC: 30 MMOL/L (ref 23–29)
COLOR UR AUTO: YELLOW
CREAT SERPL-MCNC: 0.9 MG/DL (ref 0.5–1.4)
CREAT SERPL-MCNC: 1 MG/DL (ref 0.5–1.4)
DIFFERENTIAL METHOD BLD: ABNORMAL
EOSINOPHIL # BLD AUTO: 0.1 K/UL (ref 0–0.5)
EOSINOPHIL NFR BLD: 3.8 % (ref 0–8)
ERYTHROCYTE [DISTWIDTH] IN BLOOD BY AUTOMATED COUNT: 13.7 % (ref 11.5–14.5)
EST. GFR  (NO RACE VARIABLE): >60 ML/MIN/1.73 M^2
GLUCOSE SERPL-MCNC: 95 MG/DL (ref 70–110)
GLUCOSE SERPL-MCNC: 97 MG/DL (ref 70–110)
GLUCOSE UR QL STRIP: NEGATIVE
HCT VFR BLD AUTO: 39.5 % (ref 40–54)
HCT VFR BLD CALC: 40 %PCV (ref 36–54)
HCV AB SERPL QL IA: NORMAL
HGB BLD-MCNC: 13 G/DL (ref 14–18)
HGB UR QL STRIP: NEGATIVE
HIV 1+2 AB+HIV1 P24 AG SERPL QL IA: NORMAL
IMM GRANULOCYTES # BLD AUTO: 0.01 K/UL (ref 0–0.04)
IMM GRANULOCYTES NFR BLD AUTO: 0.3 % (ref 0–0.5)
KETONES UR QL STRIP: NEGATIVE
LDH SERPL L TO P-CCNC: 1.13 MMOL/L (ref 0.5–2.2)
LEUKOCYTE ESTERASE UR QL STRIP: NEGATIVE
LIPASE SERPL-CCNC: 27 U/L (ref 4–60)
LYMPHOCYTES # BLD AUTO: 1.2 K/UL (ref 1–4.8)
LYMPHOCYTES NFR BLD: 31 % (ref 18–48)
MCH RBC QN AUTO: 31.3 PG (ref 27–31)
MCHC RBC AUTO-ENTMCNC: 32.9 G/DL (ref 32–36)
MCV RBC AUTO: 95 FL (ref 82–98)
MONOCYTES # BLD AUTO: 0.3 K/UL (ref 0.3–1)
MONOCYTES NFR BLD: 7.3 % (ref 4–15)
NEUTROPHILS # BLD AUTO: 2.1 K/UL (ref 1.8–7.7)
NEUTROPHILS NFR BLD: 57.3 % (ref 38–73)
NITRITE UR QL STRIP: NEGATIVE
NRBC BLD-RTO: 0 /100 WBC
OHS QRS DURATION: 88 MS
OHS QTC CALCULATION: 390 MS
PH UR STRIP: 8 [PH] (ref 5–8)
PLATELET # BLD AUTO: 275 K/UL (ref 150–450)
PMV BLD AUTO: 10.2 FL (ref 9.2–12.9)
POC IONIZED CALCIUM: 1.25 MMOL/L (ref 1.06–1.42)
POC TCO2 (MEASURED): 29 MMOL/L (ref 23–29)
POTASSIUM BLD-SCNC: 4.8 MMOL/L (ref 3.5–5.1)
POTASSIUM SERPL-SCNC: 4.7 MMOL/L (ref 3.5–5.1)
PROT SERPL-MCNC: 8.1 G/DL (ref 6–8.4)
PROT UR QL STRIP: NEGATIVE
RBC # BLD AUTO: 4.16 M/UL (ref 4.6–6.2)
SAMPLE: NORMAL
SAMPLE: NORMAL
SITE: NORMAL
SODIUM BLD-SCNC: 143 MMOL/L (ref 136–145)
SODIUM SERPL-SCNC: 141 MMOL/L (ref 136–145)
SP GR UR STRIP: 1.01 (ref 1–1.03)
TROPONIN I SERPL DL<=0.01 NG/ML-MCNC: <0.006 NG/ML (ref 0–0.03)
URN SPEC COLLECT METH UR: NORMAL
WBC # BLD AUTO: 3.71 K/UL (ref 3.9–12.7)

## 2024-09-09 PROCEDURE — 80047 BASIC METABLC PNL IONIZED CA: CPT

## 2024-09-09 PROCEDURE — 63600175 PHARM REV CODE 636 W HCPCS: Performed by: STUDENT IN AN ORGANIZED HEALTH CARE EDUCATION/TRAINING PROGRAM

## 2024-09-09 PROCEDURE — 96375 TX/PRO/DX INJ NEW DRUG ADDON: CPT

## 2024-09-09 PROCEDURE — 84484 ASSAY OF TROPONIN QUANT: CPT | Performed by: EMERGENCY MEDICINE

## 2024-09-09 PROCEDURE — 81003 URINALYSIS AUTO W/O SCOPE: CPT | Performed by: EMERGENCY MEDICINE

## 2024-09-09 PROCEDURE — 85025 COMPLETE CBC W/AUTO DIFF WBC: CPT | Performed by: EMERGENCY MEDICINE

## 2024-09-09 PROCEDURE — 25000003 PHARM REV CODE 250: Performed by: STUDENT IN AN ORGANIZED HEALTH CARE EDUCATION/TRAINING PROGRAM

## 2024-09-09 PROCEDURE — 93005 ELECTROCARDIOGRAM TRACING: CPT

## 2024-09-09 PROCEDURE — 99285 EMERGENCY DEPT VISIT HI MDM: CPT | Mod: 25

## 2024-09-09 PROCEDURE — 82330 ASSAY OF CALCIUM: CPT

## 2024-09-09 PROCEDURE — 87389 HIV-1 AG W/HIV-1&-2 AB AG IA: CPT | Performed by: PHYSICIAN ASSISTANT

## 2024-09-09 PROCEDURE — 25500020 PHARM REV CODE 255: Performed by: STUDENT IN AN ORGANIZED HEALTH CARE EDUCATION/TRAINING PROGRAM

## 2024-09-09 PROCEDURE — 93010 ELECTROCARDIOGRAM REPORT: CPT | Mod: ,,, | Performed by: INTERNAL MEDICINE

## 2024-09-09 PROCEDURE — 99900035 HC TECH TIME PER 15 MIN (STAT)

## 2024-09-09 PROCEDURE — 83605 ASSAY OF LACTIC ACID: CPT

## 2024-09-09 PROCEDURE — 96361 HYDRATE IV INFUSION ADD-ON: CPT

## 2024-09-09 PROCEDURE — 83690 ASSAY OF LIPASE: CPT | Performed by: EMERGENCY MEDICINE

## 2024-09-09 PROCEDURE — 86803 HEPATITIS C AB TEST: CPT | Performed by: PHYSICIAN ASSISTANT

## 2024-09-09 PROCEDURE — 96374 THER/PROPH/DIAG INJ IV PUSH: CPT | Mod: 59

## 2024-09-09 PROCEDURE — 80053 COMPREHEN METABOLIC PANEL: CPT | Performed by: EMERGENCY MEDICINE

## 2024-09-09 RX ORDER — DOCUSATE SODIUM 100 MG/1
100 CAPSULE, LIQUID FILLED ORAL 2 TIMES DAILY PRN
Qty: 28 CAPSULE | Refills: 0 | Status: SHIPPED | OUTPATIENT
Start: 2024-09-09 | End: 2024-09-23

## 2024-09-09 RX ORDER — MORPHINE SULFATE 4 MG/ML
4 INJECTION, SOLUTION INTRAMUSCULAR; INTRAVENOUS
Status: COMPLETED | OUTPATIENT
Start: 2024-09-09 | End: 2024-09-09

## 2024-09-09 RX ORDER — AMLODIPINE BESYLATE 10 MG/1
10 TABLET ORAL
Status: DISCONTINUED | OUTPATIENT
Start: 2024-09-09 | End: 2024-09-09 | Stop reason: HOSPADM

## 2024-09-09 RX ORDER — ONDANSETRON HYDROCHLORIDE 2 MG/ML
4 INJECTION, SOLUTION INTRAVENOUS
Status: COMPLETED | OUTPATIENT
Start: 2024-09-09 | End: 2024-09-09

## 2024-09-09 RX ADMIN — ONDANSETRON 4 MG: 2 INJECTION INTRAMUSCULAR; INTRAVENOUS at 02:09

## 2024-09-09 RX ADMIN — MORPHINE SULFATE 4 MG: 4 INJECTION INTRAVENOUS at 02:09

## 2024-09-09 RX ADMIN — SODIUM CHLORIDE 1000 ML: 9 INJECTION, SOLUTION INTRAVENOUS at 01:09

## 2024-09-09 RX ADMIN — IOHEXOL 75 ML: 350 INJECTION, SOLUTION INTRAVENOUS at 02:09

## 2024-09-09 NOTE — ED PROVIDER NOTES
Encounter Date: 9/9/2024       History     Chief Complaint   Patient presents with    Abdominal Pain     Abdominal pain and vomiting x 1 month     HPI  Patient is a 64-year-old male with history of hypertension, hyperlipidemia who presents for abdominal pain.  Patient states that he has had abdominal pain for the past month.  It was intermittent.  He states that he has pain in his epigastrium that is worse with eating and associated with nausea and intermittent episodes of nonbloody, nonbilious emesis.  He also reports that he has pain in his left lower quadrant which is intermittent.  He states that he previously had a right inguinal hernia repair with mesh and now has a hernia in his left inguinal area that causes him intermittent discomfort.  He states that the hernia is easily reducible.  He denies any penile or scrotal pain or swelling.  He denies any urinary symptoms.  He states that sometimes with the epigastric pain he gets some mild chest pain but has none currently.  No associated shortness of breath.  Patient states that he has had some mild constipation and has been taking over-the-counter medications to help with this.  His last bowel movement was earlier today.  He was still passing flatus.  He denies fevers and chills.    Review of patient's allergies indicates:  No Known Allergies  Past Medical History:   Diagnosis Date    Hypertension      Past Surgical History:   Procedure Laterality Date    FRACTURE SURGERY Right     ankle    HERNIA REPAIR      KNEE ARTHROPLASTY Right 10/26/2020    Procedure: ARTHROPLASTY, KNEE;  Surgeon: Virgilio Cadet MD;  Location: [facility];  Service: Orthopedics;  Laterality: Right;     No family history on file.  Social History     Tobacco Use    Smoking status: Never    Smokeless tobacco: Never   Substance Use Topics    Alcohol use: Never    Drug use: Never     Review of Systems  A full review of systems was obtained, see HPI for pertinent positives    Physical Exam      Initial Vitals [09/09/24 1220]   BP Pulse Resp Temp SpO2   (!) 190/100 (!) 54 18 98.8 °F (37.1 °C) 99 %      MAP       --         Physical Exam  Constitutional: No acute distress, well-appearing, nontoxic  HENT: Normocephalic, atraumatic, membranes moist  Neck: Supple, normal range of motion  Respiratory: Non-labored  Cardiovascular: Well perfused, mildly bradycardic, regular rhythm  Gastrointestinal: Soft, mildly tender in the epigastrium without rebound or guarding, negative Connell sign, small left inguinal hernia present in the left lower quadrant but easily reducible  Genitourinary: Penis normal, testes descended bilaterally, no scrotal edema or swelling, cremasteric reflexes intact, nursing present as a chaperone in the room for exam  Integumentary: Warm and dry  Musculoskeletal: No deformity  Neurological: Awake and alert, normal motor and sensation throughout  Psychiatric: Cooperative     ED Course   Procedures  Labs Reviewed   CBC W/ AUTO DIFFERENTIAL - Abnormal       Result Value    WBC 3.71 (*)     RBC 4.16 (*)     Hemoglobin 13.0 (*)     Hematocrit 39.5 (*)     MCV 95      MCH 31.3 (*)     MCHC 32.9      RDW 13.7      Platelets 275      MPV 10.2      Immature Granulocytes 0.3      Gran # (ANC) 2.1      Immature Grans (Abs) 0.01      Lymph # 1.2      Mono # 0.3      Eos # 0.1      Baso # 0.01      nRBC 0      Gran % 57.3      Lymph % 31.0      Mono % 7.3      Eosinophil % 3.8      Basophil % 0.3      Differential Method Automated      Narrative:     ADD ON TROP PER EVITA MCCONNELL MD ORDER# [id number] @  09/09/2024    13:45    COMPREHENSIVE METABOLIC PANEL - Abnormal    Sodium 141      Potassium 4.7      Chloride 105      CO2 30 (*)     Glucose 95      BUN 11      Creatinine 1.0      Calcium 10.3      Total Protein 8.1      Albumin 4.3      Total Bilirubin 0.5      Alkaline Phosphatase 49 (*)     AST 22      ALT 10      eGFR >60.0      Anion Gap 6 (*)     Narrative:     ADD ON TROP PER EVITA WARREN  MD JAYESH ORDER# [id number] @  09/09/2024    13:45    LIPASE    Lipase 27      Narrative:     ADD ON TROP PER EVITA MCCONNELL MD ORDER# [id number] @  09/09/2024    13:45    URINALYSIS, REFLEX TO URINE CULTURE    Specimen UA Urine, Clean Catch      Color, UA Yellow      Appearance, UA Clear      pH, UA 8.0      Specific Gravity, UA 1.015      Protein, UA Negative      Glucose, UA Negative      Ketones, UA Negative      Bilirubin (UA) Negative      Occult Blood UA Negative      Nitrite, UA Negative      Leukocytes, UA Negative      Narrative:     Specimen Source->Urine   HIV 1 / 2 ANTIBODY    HIV 1/2 Ag/Ab Non-reactive      Narrative:     Release to patient->Immediate   HEPATITIS C ANTIBODY    Hepatitis C Ab Non-reactive      Narrative:     Release to patient->Immediate   TROPONIN I   TROPONIN I    Troponin I <0.006      Narrative:     ADD ON TROP PER EVITA MCCONNELL MD ORDER# [id number] @  09/09/2024    13:45    ISTAT LACTATE    POC Lactate 1.13      Sample VENOUS      Site Other      Allens Test N/A     ISTAT PROCEDURE    POC Glucose 97      POC BUN 11      POC Creatinine 0.9      POC Sodium 143      POC Potassium 4.8      POC Chloride 104      POC TCO2 (MEASURED) 29      POC Ionized Calcium 1.25      POC Hematocrit 40      Sample THIAGO     ISTAT CHEM8        ECG Results              EKG 12-lead (Final result)        Collection Time Result Time QRS Duration OHS QTC Calculation    09/09/24 13:36:40 09/09/24 16:46:43 88 390                     Final result by Interface, Lab In [facility] (09/09/24 16:46:49)                   Narrative:    Test Reason : R10.9,    Vent. Rate : 045 BPM     Atrial Rate : 045 BPM     P-R Int : 176 ms          QRS Dur : 088 ms      QT Int : 452 ms       P-R-T Axes : 070 052 053 degrees     QTc Int : 390 ms    Sinus bradycardia  Otherwise normal ECG  When compared with ECG of 06-OCT-2020 11:16,  Borderline criteria for Lateral infarct are no longer Present probably due  to lead  correction  Nonspecific T wave abnormality has replaced inverted T waves in Lateral  leads  Confirmed by Nima LOMAS MD ([id number]) on 9/9/2024 4:46:42 PM    Referred By: [email or web address]   SELF           Confirmed By:Nima LOMAS MD                                  Imaging Results              CT Abdomen Pelvis With IV Contrast NO Oral Contrast (Final result)  Result time 09/09/24 15:21:51      Final result by Pasha Hickey MD (09/09/24 15:21:51)                   Impression:      No convincing evidence for abdomen pelvic infectious process.    Tiny hepatic hypodensities too small to characterize.    Prostatomegaly.    Electronically signed by resident: Deyanira Merino  Date:    09/09/2024  Time:    15:04    Electronically signed by: Pasha Hickey  Date:    09/09/2024  Time:    15:21               Narrative:    EXAMINATION:  CT ABDOMEN PELVIS WITH IV CONTRAST    CLINICAL HISTORY:  Abdominal abscess/infection suspected;Nausea/vomiting;    TECHNIQUE:  Using 75 cc of  Omnipaque 350 IV contrast , and multi-detector helical CT technique, axial CT images of the abdomen and pelvis were obtained. 2D post-processing coronal and sagittal reconstructions was performed.    COMPARISON:  CT abdomen pelvis 01/05/2022    FINDINGS:  Lung base: Mild bi basilar dependent atelectasis.    Visualized portion of heart: No significant abnormality.    Liver: Normal in size.Several hypodensities measuring up to 3 mm more conspicuous compared to 2022.    Gallbladder: No significant abnormality.    Bile duct: No intra-or extrahepatic biliary ductal dilatation.    Spleen: No significant abnormality.    Pancreas: No significant abnormality.    Adrenal glands: No significant abnormality.    Genitourinary: The kidneys are normal in size and location. The ureters appear normal in course and caliber.  No evidence of nephrolithiasis or hydroureteronephrosis.  The urinary bladder is decompressed limiting evaluation.    Reproductive organs: Prostatomegaly.    GI tract: The  stomach is unremarkable. The visualized loops of small and large bowel show no evidence of obstruction or inflammation. Appendix not well delineated, however no indirect evidence for appendicitis.    Peritoneum: No evidence of ascites or intraperitoneal free air.    Lymph nodes: No significant adenopathy.    Vasculature: Normal caliber of abdominal aorta with mild atherosclerosis.    Abdominal wall: No significant abnormality.    Bones: Grade 1 anterolisthesis of L4 on L5.  Similar right ilium lucent lesion adjacent to SI joint.  No acute osseous abnormality.                                       Medications   amLODIPine tablet 10 mg (10 mg Oral Not Given 9/9/24 1345)   ondansetron injection 4 mg (4 mg Intravenous Given 9/9/24 1400)   morphine injection 4 mg (4 mg Intravenous Given 9/9/24 1400)   sodium chloride 0.9% bolus 1,000 mL 1,000 mL (0 mLs Intravenous Stopped 9/9/24 1713)   iohexoL (OMNIPAQUE 350) injection 75 mL (75 mLs Intravenous Given 9/9/24 1419)     Medical Decision Making  Patient is a 64-year-old male who presents for abdominal pain.  He points to his left lower quadrant where he has a left inguinal hernia that is easily reducible on exam.  He states that this is where his pain has been occurring however when I palpate his abdomen he has pain in the epigastrium.  The left inguinal hernia will require general surgery referral however it was easily reducible here today in the emergency department.  Will obtain CT imaging and blood work and treat symptomatically.  Patient does report that he has been straining more causing his hernia to pop out so plan to start him on stool softeners and refer him to General surgery for the inguinal hernia of the rest of his workup is reassuring.    Labs and imaging reviewed and reassuring.  Lactate normal.  The patient's hernia is still reducible.  No evidence of strangulation or incarceration.  He is comfortable on re-evaluation.  Patient was started on Colace and  referred to General surgery for his left inguinal hernia.  He was given very strict return precautions prior to discharge.    Amount and/or Complexity of Data Reviewed  Radiology: ordered.    Risk  OTC drugs.  Prescription drug management.               ED Course as of 09/09/24 1905   Mon Sep 09, 2024   1346 EKG with sinus bradycardia, rate 45, no STEMI [NN]   1617 Impression:     No convincing evidence for abdomen pelvic infectious process.     Tiny hepatic hypodensities too small to characterize.     Prostatomegaly.   [NN]      ED Course User Index  [NN] Yoly Dye MD                           Clinical Impression:  Final diagnoses:  [R10.9] Abdominal pain  [K40.90] Left inguinal hernia (Primary)          ED Disposition Condition    Discharge Stable          ED Prescriptions       Medication Sig Dispense Start Date End Date Auth. Provider    docusate sodium (COLACE) 100 MG capsule Take 1 capsule (100 mg total) by mouth 2 (two) times daily as needed for Constipation. 28 capsule 9/9/2024 9/23/2024 Yoly Dye MD          Follow-up Information       Follow up With Specialties Details Why Contact Info Additional Information    Quan Gallo MD Family Medicine Schedule an appointment as soon as possible for a visit   [address] [facility] [address]  [phone]       [facility] - Emergency Dept Emergency Medicine  As needed, If symptoms worsen [address] [facility] [id number] [phone]     [facility] Surgery Schedule an appointment as soon as possible for a visit   [address] [facility] [id number] [phone] [facility] - Main Building, 2nd Floor Please park in [facility] and use escalator or Clinic elevator             Yoly Dye MD  09/09/24 0724

## 2024-09-09 NOTE — ED TRIAGE NOTES
Patient identifiers for Robert Lake 64 y.o. male checked and correct. Pt arrives to ED via EMS c/o intermittent lower abdominal pain, NV x2 months. Worsened over last few days. Known hernia RLQ with mesh and known LLQ hernia . Pt also endorses chest pain and SOB during episodes of nausea/vomiting.     Chief Complaint   Patient presents with    Abdominal Pain     Abdominal pain and vomiting x 1 month     Past Medical History:   Diagnosis Date    Hypertension      Allergies reported: Review of patient's allergies indicates:  No Known Allergies      LOC: Patient is awake, alert, and aware of environment with an appropriate affect. Patient is oriented x 4 and speaking appropriately.  APPEARANCE: Patient resting comfortably and in no acute distress. Patient is clean and well groomed, patient's clothing is properly fastened.  HEENT: WDL  SKIN: The skin is warm and dry. Patient has normal skin turgor and moist mucus membranes.   MUSCULOSKELETAL: Patient is moving all extremities well, no obvious deformities noted. Pulses intact.   RESPIRATORY: Airway is open and patent. Respirations are spontaneous and non-labored with normal effort and rate.  CARDIAC: Patient has a normal rate and rhythm. No peripheral edema noted. Denies chest pain and SOB at at time of assessment.   ABDOMEN: No distention noted. Soft and tender upon palpation. +abdominal pain to LLQ, NV.   NEUROLOGICAL: pupils 3 mm, PERRL. Facial expression is symmetrical. Hand grasps are equal bilaterally. Normal sensation in all extremities when touched with finger. +dizziness

## 2024-09-09 NOTE — ED NOTES
I-STAT Chem-8+ Results:   Value Reference Range   Sodium 143 136-145 mmol/L   Potassium  4.8 3.5-5.1 mmol/L   Chloride 104  mmol/L   Ionized Calcium 1.25 1.06-1.42 mmol/L   CO2 (measured) 29 23-29 mmol/L   Glucose 97  mg/dL   BUN 11 6-30 mg/dL   Creatinine 0.9 0.5-1.4 mg/dL   Hematocrit 40 36-54%

## 2024-09-09 NOTE — DISCHARGE INSTRUCTIONS
You were seen today for abdominal discomfort.  You were diagnosed with an inguinal hernia today.  If this hernia is not reducible like we discussed or it causes you more pain or you start having constipation and having bowel movements or passing gas and start having more abdominal discomfort, you should return to the emergency department immediately for re-evaluation.  You have been referred to General surgery for this.  You can also start taking the stool softeners.

## 2024-12-02 ENCOUNTER — HOSPITAL ENCOUNTER (EMERGENCY)
Facility: HOSPITAL | Age: 64
Discharge: HOME | End: 2024-12-02
Attending: STUDENT IN AN ORGANIZED HEALTH CARE EDUCATION/TRAINING PROGRAM
Payer: MEDICAID

## 2024-12-02 VITALS
SYSTOLIC BLOOD PRESSURE: 159 MMHG | DIASTOLIC BLOOD PRESSURE: 74 MMHG | BODY MASS INDEX: 25.18 KG/M2 | OXYGEN SATURATION: 98 % | RESPIRATION RATE: 20 BRPM | HEIGHT: 69 IN | HEART RATE: 50 BPM | WEIGHT: 170 LBS | TEMPERATURE: 98 F

## 2024-12-02 DIAGNOSIS — T30.0 BURN: Primary | ICD-10-CM

## 2024-12-02 DIAGNOSIS — T30.0 FIRST DEGREE BURN: ICD-10-CM

## 2024-12-02 DIAGNOSIS — T23.262A PARTIAL THICKNESS BURN OF BACK OF LEFT HAND, INITIAL ENCOUNTER: ICD-10-CM

## 2024-12-02 PROBLEM — D72.810 LYMPHOCYTOPENIA: Status: ACTIVE | Noted: 2024-09-03

## 2024-12-02 PROBLEM — R07.9 CHEST PAIN: Status: ACTIVE | Noted: 2019-10-04

## 2024-12-02 PROBLEM — G89.29 OTHER CHRONIC PAIN: Status: ACTIVE | Noted: 2024-12-02

## 2024-12-02 PROBLEM — M19.90 ARTHRITIS: Status: ACTIVE | Noted: 2024-12-02

## 2024-12-02 PROBLEM — D64.9 ANEMIA: Status: ACTIVE | Noted: 2024-09-05

## 2024-12-02 PROBLEM — G43.109 COMPLICATED MIGRAINE: Status: ACTIVE | Noted: 2024-12-02

## 2024-12-02 PROBLEM — M51.369 DEGENERATION OF LUMBAR INTERVERTEBRAL DISC: Status: ACTIVE | Noted: 2024-12-02

## 2024-12-02 PROCEDURE — 25000003 PHARM REV CODE 250: Performed by: PHYSICIAN ASSISTANT

## 2024-12-02 PROCEDURE — 99284 EMERGENCY DEPT VISIT MOD MDM: CPT

## 2024-12-02 RX ORDER — IBUPROFEN 800 MG/1
800 TABLET ORAL EVERY 6 HOURS PRN
Qty: 20 TABLET | Refills: 0 | Status: SHIPPED | OUTPATIENT
Start: 2024-12-02

## 2024-12-02 RX ORDER — MUPIROCIN 20 MG/G
OINTMENT TOPICAL 3 TIMES DAILY
Qty: 22 G | Refills: 0 | Status: SHIPPED | OUTPATIENT
Start: 2024-12-02

## 2024-12-02 RX ORDER — IBUPROFEN 400 MG/1
800 TABLET ORAL
Status: COMPLETED | OUTPATIENT
Start: 2024-12-02 | End: 2024-12-02

## 2024-12-02 RX ADMIN — IBUPROFEN 800 MG: 400 TABLET ORAL at 08:12
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
## 2025-07-19 ENCOUNTER — HOSPITAL ENCOUNTER (EMERGENCY)
Facility: HOSPITAL | Age: 65
Discharge: LEFT BEFORE DISCHARGE | End: 2025-07-19
Attending: EMERGENCY MEDICINE
Payer: MEDICAID

## 2025-07-19 VITALS
BODY MASS INDEX: 23.49 KG/M2 | TEMPERATURE: 98 F | DIASTOLIC BLOOD PRESSURE: 68 MMHG | WEIGHT: 155 LBS | HEIGHT: 68 IN | RESPIRATION RATE: 16 BRPM | HEART RATE: 64 BPM | SYSTOLIC BLOOD PRESSURE: 118 MMHG | OXYGEN SATURATION: 98 %

## 2025-07-19 DIAGNOSIS — K92.2 GI BLEED: ICD-10-CM

## 2025-07-19 DIAGNOSIS — K59.00 CONSTIPATION, UNSPECIFIED CONSTIPATION TYPE: Primary | ICD-10-CM

## 2025-07-19 LAB
ABORH RETYPE: NORMAL
ABSOLUTE EOSINOPHIL (OHS): 0.19 K/UL
ABSOLUTE MONOCYTE (OHS): 0.22 K/UL (ref 0.3–1)
ABSOLUTE NEUTROPHIL COUNT (OHS): 1.1 K/UL (ref 1.8–7.7)
ALBUMIN SERPL BCP-MCNC: 4.5 G/DL (ref 3.5–5.2)
ALP SERPL-CCNC: 52 UNIT/L (ref 40–150)
ALT SERPL W/O P-5'-P-CCNC: 11 UNIT/L (ref 10–44)
ANION GAP (OHS): 9 MMOL/L (ref 8–16)
AST SERPL-CCNC: 39 UNIT/L (ref 11–45)
BASOPHILS # BLD AUTO: 0.01 K/UL
BASOPHILS NFR BLD AUTO: 0.3 %
BILIRUB SERPL-MCNC: 0.9 MG/DL (ref 0.1–1)
BUN SERPL-MCNC: 10 MG/DL (ref 8–23)
CALCIUM SERPL-MCNC: 9.4 MG/DL (ref 8.7–10.5)
CHLORIDE SERPL-SCNC: 107 MMOL/L (ref 95–110)
CO2 SERPL-SCNC: 27 MMOL/L (ref 23–29)
CREAT SERPL-MCNC: 1 MG/DL (ref 0.5–1.4)
ERYTHROCYTE [DISTWIDTH] IN BLOOD BY AUTOMATED COUNT: 13.3 % (ref 11.5–14.5)
GFR SERPLBLD CREATININE-BSD FMLA CKD-EPI: >60 ML/MIN/1.73/M2
GLUCOSE SERPL-MCNC: 81 MG/DL (ref 70–110)
HCT VFR BLD AUTO: 40.1 % (ref 40–54)
HCV AB SERPL QL IA: NORMAL
HGB BLD-MCNC: 13.2 GM/DL (ref 14–18)
HIV 1+2 AB+HIV1 P24 AG SERPL QL IA: NORMAL
IMM GRANULOCYTES # BLD AUTO: 0 K/UL (ref 0–0.04)
IMM GRANULOCYTES NFR BLD AUTO: 0 % (ref 0–0.5)
INDIRECT COOMBS: NORMAL
LYMPHOCYTES # BLD AUTO: 1.46 K/UL (ref 1–4.8)
MCH RBC QN AUTO: 31.3 PG (ref 27–31)
MCHC RBC AUTO-ENTMCNC: 32.9 G/DL (ref 32–36)
MCV RBC AUTO: 95 FL (ref 82–98)
NUCLEATED RBC (/100WBC) (OHS): 0 /100 WBC
PLATELET # BLD AUTO: 192 K/UL (ref 150–450)
PMV BLD AUTO: 10.7 FL (ref 9.2–12.9)
POTASSIUM SERPL-SCNC: 4.8 MMOL/L (ref 3.5–5.1)
PROT SERPL-MCNC: 7.4 GM/DL (ref 6–8.4)
RBC # BLD AUTO: 4.22 M/UL (ref 4.6–6.2)
RELATIVE EOSINOPHIL (OHS): 6.4 %
RELATIVE LYMPHOCYTE (OHS): 49 % (ref 18–48)
RELATIVE MONOCYTE (OHS): 7.4 % (ref 4–15)
RELATIVE NEUTROPHIL (OHS): 36.9 % (ref 38–73)
RH BLD: NORMAL
SODIUM SERPL-SCNC: 143 MMOL/L (ref 136–145)
SPECIMEN OUTDATE: NORMAL
WBC # BLD AUTO: 2.98 K/UL (ref 3.9–12.7)

## 2025-07-19 PROCEDURE — 25500020 PHARM REV CODE 255: Performed by: EMERGENCY MEDICINE

## 2025-07-19 PROCEDURE — 93005 ELECTROCARDIOGRAM TRACING: CPT

## 2025-07-19 PROCEDURE — 93010 ELECTROCARDIOGRAM REPORT: CPT | Mod: ,,, | Performed by: INTERNAL MEDICINE

## 2025-07-19 PROCEDURE — 87389 HIV-1 AG W/HIV-1&-2 AB AG IA: CPT | Performed by: PHYSICIAN ASSISTANT

## 2025-07-19 PROCEDURE — 86901 BLOOD TYPING SEROLOGIC RH(D): CPT | Performed by: EMERGENCY MEDICINE

## 2025-07-19 PROCEDURE — 86803 HEPATITIS C AB TEST: CPT | Performed by: PHYSICIAN ASSISTANT

## 2025-07-19 PROCEDURE — 99285 EMERGENCY DEPT VISIT HI MDM: CPT | Mod: 25

## 2025-07-19 PROCEDURE — 80053 COMPREHEN METABOLIC PANEL: CPT | Performed by: PHYSICIAN ASSISTANT

## 2025-07-19 PROCEDURE — 85025 COMPLETE CBC W/AUTO DIFF WBC: CPT | Performed by: PHYSICIAN ASSISTANT

## 2025-07-19 RX ORDER — POLYETHYLENE GLYCOL 3350 17 G/17G
17 POWDER, FOR SOLUTION ORAL 3 TIMES DAILY PRN
Qty: 238 G | Refills: 0 | Status: SHIPPED | OUTPATIENT
Start: 2025-07-19

## 2025-07-19 RX ORDER — BISACODYL 10 MG/1
10 SUPPOSITORY RECTAL DAILY
Qty: 7 SUPPOSITORY | Refills: 0 | Status: SHIPPED | OUTPATIENT
Start: 2025-07-19

## 2025-07-19 RX ORDER — POLYETHYLENE GLYCOL 3350 17 G/17G
POWDER, FOR SOLUTION ORAL
COMMUNITY
End: 2025-07-19 | Stop reason: ALTCHOICE

## 2025-07-19 RX ORDER — SYRING-NEEDL,DISP,INSUL,0.3 ML 29 G X1/2"
296 SYRINGE, EMPTY DISPOSABLE MISCELLANEOUS DAILY
Qty: 1000 ML | Refills: 0 | Status: SHIPPED | OUTPATIENT
Start: 2025-07-19 | End: 2025-07-22

## 2025-07-19 RX ADMIN — IOHEXOL 75 ML: 350 INJECTION, SOLUTION INTRAVENOUS at 03:07

## 2025-07-19 NOTE — ED NOTES
Pt relays he has to leave immediately and demands iv removed. Pt's iv removed, pt elopes and provider notified.
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
## 2025-07-19 NOTE — ED NOTES
LOC: The patient is awake, alert, and oriented to self, place, time, and situation. Pt is calm and cooperative. Affect is appropriate. Speech is appropriate and clear.     APPEARANCE: Patient resting uncomfortably reporting abdominal pain, decreased appetite, and constipation for the past month.  Patient is clean and well groomed.    SKIN: The skin is warm and dry; color consistent with ethnicity. Patient has normal skin turgor and moist mucus membranes. Skin intact; no breakdown or bruising noted.     MUSCULOSKELETAL: Patient moving upper and lower extremities without difficulty; denies pain in the extremities or back. Denies weakness.     RESPIRATORY: Airway is open and patent. Respirations spontaneous, even, easy, and non-labored. Patient has a normal effort and rate.  No accessory muscle use noted. Denies cough.     CARDIAC:  No peripheral edema noted. No complaints of chest pain.     ABDOMEN: Soft and  tender to palpation.Pt reporting  abdominal pain; constipation.    NEUROLOGIC: Eyes open spontaneously. Behavior appropriate to situation. Follows commands; facial expression symmetrical. Purposeful motor response noted; normal sensation in all extremities. Pt denies headache; denies lightheadedness or dizziness; denies visual disturbances; denies loss of balance; denies unilateral weakness.

## 2025-07-20 LAB
OHS QRS DURATION: 88 MS
OHS QTC CALCULATION: 400 MS

## 2025-07-22 LAB — HOLD SPECIMEN: NORMAL

## 2025-07-31 ENCOUNTER — PATIENT OUTREACH (OUTPATIENT)
Facility: OTHER | Age: 65
End: 2025-07-31
Payer: MEDICAID

## 2025-07-31 NOTE — PROGRESS NOTES
Attempted to call patient and the number listed as work number is disconnected and the number listed as home is the police office number. Will attempt calling tomorrow 8/1/25

## 2025-08-01 NOTE — PROGRESS NOTES
Patient number listed is not in service and the other number is the [facility] office. Episode closed due to not able to contact.